# Patient Record
Sex: FEMALE | Race: WHITE | Employment: UNEMPLOYED | URBAN - METROPOLITAN AREA
[De-identification: names, ages, dates, MRNs, and addresses within clinical notes are randomized per-mention and may not be internally consistent; named-entity substitution may affect disease eponyms.]

---

## 2017-01-16 ENCOUNTER — OFFICE VISIT (OUTPATIENT)
Dept: FAMILY MEDICINE CLINIC | Facility: CLINIC | Age: 47
End: 2017-01-16

## 2017-01-16 VITALS
HEIGHT: 66.54 IN | HEART RATE: 102 BPM | WEIGHT: 213.81 LBS | BODY MASS INDEX: 33.96 KG/M2 | DIASTOLIC BLOOD PRESSURE: 68 MMHG | SYSTOLIC BLOOD PRESSURE: 112 MMHG

## 2017-01-16 DIAGNOSIS — E66.09 NON MORBID OBESITY DUE TO EXCESS CALORIES: ICD-10-CM

## 2017-01-16 DIAGNOSIS — B02.9 HERPES ZOSTER WITHOUT COMPLICATION: ICD-10-CM

## 2017-01-16 DIAGNOSIS — E28.2 PCOS (POLYCYSTIC OVARIAN SYNDROME): ICD-10-CM

## 2017-01-16 DIAGNOSIS — Z51.81 THERAPEUTIC DRUG MONITORING: Primary | ICD-10-CM

## 2017-01-16 PROCEDURE — 99214 OFFICE O/P EST MOD 30 MIN: CPT | Performed by: FAMILY MEDICINE

## 2017-01-16 NOTE — PROGRESS NOTES
Matthew Curling is a 42year old female. HPI:       Rash:  Noticed 8 days ago on Monday. This is the same rash she had in the same place twice in the past.  Last time she had this rash we did diagnose her with zoster.   Intermittently has sharp shooting pains Former Smoker                   Packs/Day: 0.50  Years: 4         Types: Cigarettes      Quit date: 01/01/2000    Smokeless Status: Never Used                        Alcohol Use: No                 REVIEW OF SYSTEMS:   Review of Systems   Constitutional: N 33.0-33.9,adult  Non morbid obesity due to excess calories  Herpes zoster without complication    1. Therapeutic drug monitoring  Medication use, risks, benefits, side effects and precautions discussed, patient verbalizes understanding.  Questions encourage

## 2017-01-17 PROBLEM — B02.9 HERPES ZOSTER WITHOUT COMPLICATION: Status: ACTIVE | Noted: 2017-01-17

## 2017-02-09 ENCOUNTER — TELEPHONE (OUTPATIENT)
Dept: FAMILY MEDICINE CLINIC | Facility: CLINIC | Age: 47
End: 2017-02-09

## 2017-02-09 RX ORDER — PHENTERMINE HYDROCHLORIDE 15 MG/1
15 CAPSULE ORAL EVERY MORNING
Qty: 30 CAPSULE | Refills: 0 | Status: SHIPPED | OUTPATIENT
Start: 2017-02-09 | End: 2017-03-13

## 2017-02-09 RX ORDER — TOPIRAMATE 25 MG/1
TABLET ORAL
Qty: 70 TABLET | Refills: 0 | Status: SHIPPED | OUTPATIENT
Start: 2017-02-09 | End: 2017-03-13 | Stop reason: DRUGHIGH

## 2017-02-09 NOTE — TELEPHONE ENCOUNTER
Pt states the last time she was in she forgot to talk to Dr. Shelli Myers about her weight loss medication. She said Dr. Shelli Myers told her there was another medication that would benefit her because her body is used to the Phentermine.   She wants to know if the

## 2017-02-10 NOTE — TELEPHONE ENCOUNTER
lmom for pt with detailed instructions and also that Phentermine was called in to pharmacy. Asked pt after reviewing message to call office with any questions.

## 2017-02-10 NOTE — TELEPHONE ENCOUNTER
Prescription sent for topiramate, please explain to patient that we will titrate up on the dose from a starting point of taking once a day in the morning.   Please inform patient that there can be several side effects, one of which some people get and some

## 2017-02-20 ENCOUNTER — TELEPHONE (OUTPATIENT)
Dept: FAMILY MEDICINE CLINIC | Facility: CLINIC | Age: 47
End: 2017-02-20

## 2017-02-20 NOTE — TELEPHONE ENCOUNTER
The Phentermine was filled to [facility] on 2/9/17. Called [facility] and they had it ready but will cancel it.   Script called then to Jan per pt request.

## 2017-03-13 ENCOUNTER — OFFICE VISIT (OUTPATIENT)
Dept: FAMILY MEDICINE CLINIC | Facility: CLINIC | Age: 47
End: 2017-03-13

## 2017-03-13 VITALS
HEART RATE: 76 BPM | WEIGHT: 210 LBS | HEIGHT: 66.54 IN | BODY MASS INDEX: 33.35 KG/M2 | SYSTOLIC BLOOD PRESSURE: 112 MMHG | DIASTOLIC BLOOD PRESSURE: 74 MMHG

## 2017-03-13 DIAGNOSIS — Z71.3 ENCOUNTER FOR WEIGHT LOSS COUNSELING: ICD-10-CM

## 2017-03-13 DIAGNOSIS — Z51.81 THERAPEUTIC DRUG MONITORING: Primary | ICD-10-CM

## 2017-03-13 DIAGNOSIS — E66.09 NON MORBID OBESITY DUE TO EXCESS CALORIES: ICD-10-CM

## 2017-03-13 DIAGNOSIS — Z79.899 HIGH RISK MEDICATION USE: ICD-10-CM

## 2017-03-13 DIAGNOSIS — E28.2 PCOS (POLYCYSTIC OVARIAN SYNDROME): ICD-10-CM

## 2017-03-13 PROCEDURE — 99213 OFFICE O/P EST LOW 20 MIN: CPT | Performed by: FAMILY MEDICINE

## 2017-03-13 RX ORDER — TOPIRAMATE 50 MG/1
50 TABLET, FILM COATED ORAL 2 TIMES DAILY
Qty: 60 TABLET | Refills: 2 | Status: SHIPPED | OUTPATIENT
Start: 2017-03-13 | End: 2017-06-05

## 2017-03-13 RX ORDER — PHENTERMINE HYDROCHLORIDE 15 MG/1
15 CAPSULE ORAL EVERY MORNING
Qty: 30 CAPSULE | Refills: 0 | Status: SHIPPED | OUTPATIENT
Start: 2017-03-13 | End: 2017-07-10

## 2017-03-13 NOTE — PROGRESS NOTES
Angie Richards is a 42year old female. HPI:     Obesity: first follow up since starting the topiramate. Some intermittent tingling of the finger tips and toe tips. Patient states that this side effect is tolerable.   Patient would like to continue with the Negative for rash. Neurological: Negative for dizziness, light-headedness and headaches. Psychiatric/Behavioral: Negative for depressed mood. The patient is not nervous/anxious.         EXAM:   /74 mmHg  Pulse 76  Ht 66.54\"  Wt 210 lb  BMI 33.35 calories  As above in #3. Medication use, risks, benefits, side effects and precautions discussed, patient verbalizes understanding. Questions encouraged and answered to patient's satisfaction.         Meds & Refills for this Visit:  Signed Prescriptio

## 2017-03-29 ENCOUNTER — TELEPHONE (OUTPATIENT)
Dept: FAMILY MEDICINE CLINIC | Facility: CLINIC | Age: 47
End: 2017-03-29

## 2017-04-24 ENCOUNTER — LAB ENCOUNTER (OUTPATIENT)
Dept: LAB | Age: 47
End: 2017-04-24
Attending: FAMILY MEDICINE
Payer: COMMERCIAL

## 2017-04-24 ENCOUNTER — OFFICE VISIT (OUTPATIENT)
Dept: FAMILY MEDICINE CLINIC | Facility: CLINIC | Age: 47
End: 2017-04-24

## 2017-04-24 VITALS
HEIGHT: 66.54 IN | WEIGHT: 204.81 LBS | HEART RATE: 96 BPM | BODY MASS INDEX: 32.53 KG/M2 | SYSTOLIC BLOOD PRESSURE: 108 MMHG | DIASTOLIC BLOOD PRESSURE: 72 MMHG

## 2017-04-24 DIAGNOSIS — Z00.00 LABORATORY EXAM ORDERED AS PART OF ROUTINE GENERAL MEDICAL EXAMINATION: ICD-10-CM

## 2017-04-24 DIAGNOSIS — Z00.00 ROUTINE GENERAL MEDICAL EXAMINATION AT A HEALTH CARE FACILITY: ICD-10-CM

## 2017-04-24 DIAGNOSIS — Z30.011 ORAL CONTRACEPTION INITIAL PRESCRIPTION: ICD-10-CM

## 2017-04-24 DIAGNOSIS — Z01.419 ENCOUNTER FOR GYNECOLOGICAL EXAMINATION WITHOUT ABNORMAL FINDING: Primary | ICD-10-CM

## 2017-04-24 DIAGNOSIS — E66.09 NON MORBID OBESITY DUE TO EXCESS CALORIES: ICD-10-CM

## 2017-04-24 DIAGNOSIS — Z86.19 HISTORY OF HERPES ZOSTER: ICD-10-CM

## 2017-04-24 DIAGNOSIS — R10.2 PELVIC PAIN: ICD-10-CM

## 2017-04-24 DIAGNOSIS — Z12.31 ENCOUNTER FOR SCREENING MAMMOGRAM FOR MALIGNANT NEOPLASM OF BREAST: ICD-10-CM

## 2017-04-24 DIAGNOSIS — E28.2 PCOS (POLYCYSTIC OVARIAN SYNDROME): ICD-10-CM

## 2017-04-24 DIAGNOSIS — Z12.4 SCREENING FOR MALIGNANT NEOPLASM OF CERVIX: ICD-10-CM

## 2017-04-24 PROCEDURE — 88175 CYTOPATH C/V AUTO FLUID REDO: CPT | Performed by: FAMILY MEDICINE

## 2017-04-24 PROCEDURE — 82306 VITAMIN D 25 HYDROXY: CPT | Performed by: FAMILY MEDICINE

## 2017-04-24 PROCEDURE — 99396 PREV VISIT EST AGE 40-64: CPT | Performed by: FAMILY MEDICINE

## 2017-04-24 PROCEDURE — 80050 GENERAL HEALTH PANEL: CPT | Performed by: FAMILY MEDICINE

## 2017-04-24 PROCEDURE — 36415 COLL VENOUS BLD VENIPUNCTURE: CPT | Performed by: FAMILY MEDICINE

## 2017-04-24 PROCEDURE — 84439 ASSAY OF FREE THYROXINE: CPT | Performed by: FAMILY MEDICINE

## 2017-04-24 PROCEDURE — 80061 LIPID PANEL: CPT | Performed by: FAMILY MEDICINE

## 2017-04-24 RX ORDER — TOPIRAMATE 50 MG/1
50 TABLET, FILM COATED ORAL 2 TIMES DAILY
COMMUNITY
End: 2017-06-05

## 2017-04-24 RX ORDER — NORGESTIMATE AND ETHINYL ESTRADIOL 0.25-0.035
1 KIT ORAL DAILY
Qty: 1 PACKAGE | Refills: 5 | Status: SHIPPED | OUTPATIENT
Start: 2017-04-24 | End: 2017-08-14

## 2017-04-24 NOTE — PATIENT INSTRUCTIONS
SCHEDULING [facility] LAB APPOINTMENTS ONLINE    Lab appointments can now be scheduled online at [email or web address]    · Go to [email or web address]  · In Search type Lab  · Click \"Lab services\"  · Click \"Schedule Your Test Online\"  · Follow the prompts  · often you should have a mammogram.   Pap test: at least every 3 years if you have ever had sex and have not had your uterus removed.  Your healthcare provider may recommend more frequent screening if you have had any abnormalities in the past or if you have smoke or do not get regular exercise). Osteoporosis is a disease that thins and weakens bones to the point where they break easily. Hearing test: if you are 72 or older   Vision test: if you are 72 or older.    Remember, these are the minimum recommendati a hepatitis shot and for all adults who are at risk of infection.  This includes, for example, women who have more than 1 sex partner or whose partner has more than 1 partner, or who have a sexually transmitted infection, abuse IV drugs, or plan to travel w partners. Hormone use: During or after menopause, discuss the risks and benefits of use of estrogen and progesterone replacement with your healthcare provider.    Osteoporosis prevention:  Advise 1,500 mg of calcium with 1,000 iu of vitamin D daily and da

## 2017-04-24 NOTE — PROGRESS NOTES
HPI:   Carolyn Goldstein is a 42year old female who presents for a complete physical exam.   Symptoms: denies discharge, itching, burning or dysuria, periods are every 21-24 days.    Last PAP: 10/2015  Abnormal PAP: LAINEY 1995  Sexually active: yes   Last mammogr Father    • idiopathic hypertrophic subaortic stenosis [Other] [OTHER] Paternal Grandfather       Social History:     Smoking Status: Former Smoker                   Packs/Day: 0.50  Years: 4         Types: Cigarettes      Quit date: 01/01/2000    Smokeles without murmur normal S1S2  ABD:  normal bowel sounds,soft, non tender, no masses, HSM or tenderness  : External genitalia normal, introitus and vagina are normal, normal discharge and normal cervix.   Bimanual exam normal, no adnexal masses or cervical m to the lungs, heart or brain and cause Heart Attack, Stroke and even Death. These risks are further increased with smoking. Do not start smoking. Patient verbalizes understanding.    - Norgestimate-Eth Estradiol ([address] [address]) 0.25-35 MG-MCG Oral Tab;  Take on 03/30/2016  Influenza Vaccine(1) due on 09/01/2016  Annual Depression Screen due on 01/13/2017  Return in about 2 months (around 6/24/2017) for PCOS. Self breast exam explained and advised to perform once a month.  Health maintenance guidance gi

## 2017-04-27 ENCOUNTER — TELEPHONE (OUTPATIENT)
Dept: FAMILY MEDICINE CLINIC | Facility: CLINIC | Age: 47
End: 2017-04-27

## 2017-04-27 NOTE — TELEPHONE ENCOUNTER
----- Message from Finesse Morrison DO sent at 4/26/2017  9:16 PM CDT -----  Pap and high-risk HPV negative

## 2017-05-03 ENCOUNTER — TELEPHONE (OUTPATIENT)
Dept: FAMILY MEDICINE CLINIC | Facility: CLINIC | Age: 47
End: 2017-05-03

## 2017-05-03 DIAGNOSIS — R79.89 ABNORMAL CBC: Primary | ICD-10-CM

## 2017-05-03 NOTE — TELEPHONE ENCOUNTER
----- Message from Christian Rojo DO sent at 5/2/2017  6:51 PM CDT -----  Please call patient: Kidney function is a little bit elevated, this is happened in the past as well. Please encourage patient to drink plenty of water and avoid NSAIDs.   Lipid pan

## 2017-05-03 NOTE — TELEPHONE ENCOUNTER
Neda Theodore would like a return phone call at her work number [phone] she cannot have her cell with her today

## 2017-05-03 NOTE — TELEPHONE ENCOUNTER
A message was left on the patient's confidential voicemail asking her to call back to go over her test results. An order was placed for a CBC to be done in 3 months.

## 2017-05-15 ENCOUNTER — HOSPITAL ENCOUNTER (OUTPATIENT)
Age: 47
Discharge: HOME | End: 2017-05-15
Payer: COMMERCIAL

## 2017-05-15 ENCOUNTER — APPOINTMENT (OUTPATIENT)
Dept: GENERAL RADIOLOGY | Age: 47
End: 2017-05-15
Attending: PHYSICIAN ASSISTANT
Payer: COMMERCIAL

## 2017-05-15 ENCOUNTER — TELEPHONE (OUTPATIENT)
Dept: FAMILY MEDICINE CLINIC | Facility: CLINIC | Age: 47
End: 2017-05-15

## 2017-05-15 VITALS
TEMPERATURE: 98 F | HEART RATE: 78 BPM | RESPIRATION RATE: 18 BRPM | SYSTOLIC BLOOD PRESSURE: 157 MMHG | DIASTOLIC BLOOD PRESSURE: 98 MMHG | OXYGEN SATURATION: 98 %

## 2017-05-15 DIAGNOSIS — S60.211A CONTUSION OF RIGHT WRIST, INITIAL ENCOUNTER: ICD-10-CM

## 2017-05-15 DIAGNOSIS — S60.211A CONTUSION OF RIGHT WRIST, INITIAL ENCOUNTER: Primary | ICD-10-CM

## 2017-05-15 DIAGNOSIS — S66.911A WRIST STRAIN, RIGHT, INITIAL ENCOUNTER: Primary | ICD-10-CM

## 2017-05-15 DIAGNOSIS — R10.2 PELVIC PAIN IN FEMALE: ICD-10-CM

## 2017-05-15 DIAGNOSIS — E28.2 PCOS (POLYCYSTIC OVARIAN SYNDROME): ICD-10-CM

## 2017-05-15 DIAGNOSIS — S66.911A WRIST STRAIN, RIGHT, INITIAL ENCOUNTER: ICD-10-CM

## 2017-05-15 PROCEDURE — 99204 OFFICE O/P NEW MOD 45 MIN: CPT

## 2017-05-15 PROCEDURE — 73110 X-RAY EXAM OF WRIST: CPT | Performed by: PHYSICIAN ASSISTANT

## 2017-05-15 PROCEDURE — 73130 X-RAY EXAM OF HAND: CPT | Performed by: PHYSICIAN ASSISTANT

## 2017-05-15 PROCEDURE — 99213 OFFICE O/P EST LOW 20 MIN: CPT

## 2017-05-15 RX ORDER — NAPROXEN 500 MG/1
500 TABLET ORAL 2 TIMES DAILY PRN
Qty: 30 TABLET | Refills: 0 | Status: SHIPPED | OUTPATIENT
Start: 2017-05-15 | End: 2017-05-22

## 2017-05-15 NOTE — TELEPHONE ENCOUNTER
Dr Miri Ayers advise. Do you want to order thumb xray(not sure which thumb), have patient see you first or send her to ?

## 2017-05-15 NOTE — ED INITIAL ASSESSMENT (HPI)
Patient states was playing basketball and inured her right thumb and wrist  Discomfort  Swelling noted

## 2017-05-15 NOTE — ED PROVIDER NOTES
Patient Seen in: [facility]    History   No chief complaint on file.     Stated Complaint: HAND/WRIST INJURY     HPI    41yo F who comes in with complaints of right wrist and hand pain it began 6 weeks ago when she was getting  Grandfather    • idiopathic hyprtrophic subaortic stenosis [Other] [OTHER] Father    • idiopathic hypertrophic subaortic stenosis [Other] [OTHER] Paternal Grandfather          Smoking Status: Former Smoker                   Packs/Day: 0.50  Years: 4 deformity and no laceration. Right hand: She exhibits tenderness (over dorsal hand on 1st and 2nd metacarpals, mild non specific snuffbox ttp). Normal sensation noted. Normal strength noted. Lymphadenopathy:     She has no cervical adenopathy.    Blayne Kitchen thumb spica, follow up with RONDA davila    The patient is in good condition throughout her visit today and remains so upon discharge.  I discuss the plan of care with the patient, who expresses understanding.  All questions and concerns are addressed to th

## 2017-05-16 NOTE — TELEPHONE ENCOUNTER
Patient also requesting a new order for the pelvic US that was ordered 6/20/16. Patient has new insurance.   New order placed for pelvis US

## 2017-05-16 NOTE — TELEPHONE ENCOUNTER
LMTCB    Patient was seen at [facility] last night. X-ray show no fracture.  Patient is to f/u with ortho per d/c instructions    Disposition:  Discharge    Follow-up:  Haylie Holbrook, [address]    Schedule an alex

## 2017-05-16 NOTE — TELEPHONE ENCOUNTER
Per Dr Savanna Leach patient to Dr Selvin Dolan  Referral placed.   Per phone consent left message for patient with contact info for Dr Selvin oDlan   [phone]  [address] [place] [address]

## 2017-05-24 ENCOUNTER — TELEPHONE (OUTPATIENT)
Dept: FAMILY MEDICINE CLINIC | Facility: CLINIC | Age: 47
End: 2017-05-24

## 2017-05-24 DIAGNOSIS — M25.519 SHOULDER PAIN, UNSPECIFIED CHRONICITY, UNSPECIFIED LATERALITY: Primary | ICD-10-CM

## 2017-05-24 NOTE — TELEPHONE ENCOUNTER
Dr Rebecca Moura you want patient to see Dr Devin Walls or do you want to refer her to another ortho?

## 2017-05-25 ENCOUNTER — TELEPHONE (OUTPATIENT)
Dept: FAMILY MEDICINE CLINIC | Facility: CLINIC | Age: 47
End: 2017-05-25

## 2017-05-25 DIAGNOSIS — S60.211A CONTUSION OF RIGHT WRIST, INITIAL ENCOUNTER: Primary | ICD-10-CM

## 2017-05-25 DIAGNOSIS — IMO0001 STRAIN OF RIGHT HAND AND FINGER, INITIAL ENCOUNTER: ICD-10-CM

## 2017-05-25 NOTE — TELEPHONE ENCOUNTER
Juan Day and/or Dunia Verma,  Please call the  for [facility] [place]:  Pt first called and was told she couldn't be seen by Dr. Malvin Jones because for a hand problem because he does not see pts with hand problems. Now we have the problem as below.   D

## 2017-05-26 NOTE — TELEPHONE ENCOUNTER
Dr Stevo Arellano, [facility] does have Dr Sole Alexandre who is in network but she only sees patient in [facility] or [place]  Per Dr Savanna Leach patient to Dr Sandeep Bower

## 2017-06-05 ENCOUNTER — TELEPHONE (OUTPATIENT)
Dept: FAMILY MEDICINE CLINIC | Facility: CLINIC | Age: 47
End: 2017-06-05

## 2017-06-05 DIAGNOSIS — Z51.81 THERAPEUTIC DRUG MONITORING: Primary | ICD-10-CM

## 2017-06-05 RX ORDER — TOPIRAMATE 50 MG/1
50 TABLET, FILM COATED ORAL 2 TIMES DAILY
Qty: 60 TABLET | Refills: 2 | Status: SHIPPED | OUTPATIENT
Start: 2017-06-05 | End: 2017-08-14

## 2017-06-20 ENCOUNTER — TELEPHONE (OUTPATIENT)
Dept: FAMILY MEDICINE CLINIC | Facility: CLINIC | Age: 47
End: 2017-06-20

## 2017-06-20 RX ORDER — ALPRAZOLAM 0.25 MG/1
TABLET ORAL
Qty: 20 TABLET | Refills: 0 | Status: SHIPPED | OUTPATIENT
Start: 2017-06-20 | End: 2017-07-10

## 2017-06-20 NOTE — TELEPHONE ENCOUNTER
Future Appointments  Date Time Provider Laura Burdick   7/10/2017 10:30 AM Rene See, DO [facility] [place]       Pt is asking if she can go on medication for anxiety that previously discussed with Dr Lorena Morelos.   Her daughter injured her collar b

## 2017-06-20 NOTE — TELEPHONE ENCOUNTER
Order placed for alprazolam/xanax,  Please phone or fax in. Please advise pt this medication may make her sleepy and not to drive or drink any alcohol when taking this medication.   We will discuss further at next appt

## 2017-06-20 NOTE — TELEPHONE ENCOUNTER
Dr Linder advise. Not sure when this was discussed with patient  Do you want to wait until her ov on 7/10/17 to discuss?

## 2017-06-21 NOTE — TELEPHONE ENCOUNTER
rx has been phoned in to pharmacy  Per phone consent left message for patient that rx has been sent to her pharmacy and cautioned her not to drink or drive while taking this medication.

## 2017-07-10 PROBLEM — F41.1 GAD (GENERALIZED ANXIETY DISORDER): Status: ACTIVE | Noted: 2017-07-10

## 2017-07-10 PROBLEM — R10.2 PELVIC PAIN: Status: ACTIVE | Noted: 2017-07-10

## 2017-07-10 NOTE — PATIENT INSTRUCTIONS
Understanding Anxiety Disorders  Almost everyone gets nervous now and then. It’s normal to have knots in your stomach before a test, or for your heart to race on a first date. But an anxiety disorder is much more than a case of nerves.  In fact, its sympt You may believe that nothing can help you. Or, you might fear what others may think. But most anxiety symptoms can be eased. Having an anxiety disorder is nothing to be ashamed of. Most people do best with treatment that combines medication and therapy.  Al Anxiety can become a problem when it is difficult to control, occurs for months, and interferes with important parts of your life. With an anxiety disorder, your body has the response described above, but in inappropriate ways.  The response a person has de If you have an anxiety disorder, you don’t have to suffer anymore. Treatment is available. Therapy (also called counseling) is often a helpful treatment for anxiety disorders.  With therapy, a specially trained professional (therapist) helps you face and le Therapy will help you feel better and teach you skills to help manage anxiety long term. But change doesn’t happen right away. It takes a commitment from you. And treatment only works if you learn to face the causes of your anxiety.  So, you might feel wors

## 2017-07-10 NOTE — PROGRESS NOTES
Ilda Mon is a 42year old female. HPI:       Anxiety:  Feels it was triggered or worsened by by getting braces. Grinds her teeth all the time, starting at the age of 4 or 5. Has been seeing the dentist because of the teeth grinding.   States has endur tablets by mouth as needed.    Disp:  Rfl:       Patient Active Problem List:     Herpes zoster     Bruxism     PCOS (polycystic ovarian syndrome)     Routine general medical examination at a health care facility     Heel pain, chronic     BMI 32.0-32.9,elvie thoughts of harming herself or others       EXAM:   /78 (BP Location: Right arm, Patient Position: Sitting, Cuff Size: large)   Pulse 76   Resp 18   Ht 66.54\"   Wt 205 lb 3.2 oz   BMI 32.58 kg/m²  Estimated body mass index is 32.58 kg/m² as calculat anxiety with sertraline will help reduce the bruxism. Follow-up with dentist as instructed. 3. PCOS (polycystic ovarian syndrome)  Continue metformin. Pelvic ultrasound reordered. - MetFORMIN HCl 1000 MG Oral Tab;  Take 1 tablet (1,000 mg total) by mo for 4-6 weeks for therapeutic drug monitoring and start of sertraline.

## 2017-07-10 NOTE — ADDENDUM NOTE
Encounter addended by:  Drake Ellis [place] on: 7/10/2017  1:08 PM<BR>    Actions taken:  activity accessed

## 2017-07-24 ENCOUNTER — HOSPITAL ENCOUNTER (OUTPATIENT)
Dept: MAMMOGRAPHY | Age: 47
Discharge: HOME | End: 2017-07-24
Attending: FAMILY MEDICINE
Payer: COMMERCIAL

## 2017-07-24 ENCOUNTER — HOSPITAL ENCOUNTER (OUTPATIENT)
Dept: ULTRASOUND IMAGING | Age: 47
Discharge: HOME | End: 2017-07-24
Attending: FAMILY MEDICINE
Payer: COMMERCIAL

## 2017-07-24 DIAGNOSIS — R10.2 PELVIC PAIN: ICD-10-CM

## 2017-07-24 DIAGNOSIS — Z12.31 ENCOUNTER FOR SCREENING MAMMOGRAM FOR MALIGNANT NEOPLASM OF BREAST: ICD-10-CM

## 2017-07-24 DIAGNOSIS — E28.2 PCOS (POLYCYSTIC OVARIAN SYNDROME): ICD-10-CM

## 2017-07-24 PROCEDURE — 77063 BREAST TOMOSYNTHESIS BI: CPT | Performed by: FAMILY MEDICINE

## 2017-07-24 PROCEDURE — 76830 TRANSVAGINAL US NON-OB: CPT | Performed by: FAMILY MEDICINE

## 2017-07-24 PROCEDURE — 77067 SCR MAMMO BI INCL CAD: CPT | Performed by: FAMILY MEDICINE

## 2017-07-24 PROCEDURE — 76856 US EXAM PELVIC COMPLETE: CPT | Performed by: FAMILY MEDICINE

## 2017-07-25 ENCOUNTER — TELEPHONE (OUTPATIENT)
Dept: FAMILY MEDICINE CLINIC | Facility: CLINIC | Age: 47
End: 2017-07-25

## 2017-07-25 DIAGNOSIS — R92.8 ABNORMAL MAGNETIC RESONANCE IMAGING OF RIGHT BREAST: Primary | ICD-10-CM

## 2017-07-25 NOTE — TELEPHONE ENCOUNTER
----- Message from Susie Aguilera DO sent at 7/25/2017 10:30 AM CDT -----  Please call patient: The ultrasound of the pelvis reports retroflexion of the uterus, this is considered a normal variant.   Ovarian follicles are reported but it is stated that th

## 2017-07-25 NOTE — TELEPHONE ENCOUNTER
----- Message from Irina Gomez DO sent at 7/25/2017  9:56 AM CDT -----  Please call patient and verify she knows the radiologist is requesting additional views.

## 2017-07-26 NOTE — TELEPHONE ENCOUNTER
Spoke to patient about her test results/instructions. Pt verbalized understanding. Orders in system for additional views.

## 2017-08-02 ENCOUNTER — HOSPITAL ENCOUNTER (OUTPATIENT)
Dept: MAMMOGRAPHY | Facility: HOSPITAL | Age: 47
Discharge: HOME | End: 2017-08-02
Attending: FAMILY MEDICINE
Payer: COMMERCIAL

## 2017-08-02 DIAGNOSIS — R92.2 INCONCLUSIVE MAMMOGRAM: ICD-10-CM

## 2017-08-02 PROCEDURE — 77061 BREAST TOMOSYNTHESIS UNI: CPT | Performed by: FAMILY MEDICINE

## 2017-08-02 PROCEDURE — 77065 DX MAMMO INCL CAD UNI: CPT | Performed by: FAMILY MEDICINE

## 2017-08-02 PROCEDURE — 76642 ULTRASOUND BREAST LIMITED: CPT | Performed by: FAMILY MEDICINE

## 2017-08-07 ENCOUNTER — LABORATORY ENCOUNTER (OUTPATIENT)
Dept: LAB | Age: 47
End: 2017-08-07
Attending: FAMILY MEDICINE
Payer: COMMERCIAL

## 2017-08-07 DIAGNOSIS — R79.89 ABNORMAL CBC: ICD-10-CM

## 2017-08-07 LAB
BASOPHILS # BLD AUTO: 0.01 X10(3) UL (ref 0–0.1)
BASOPHILS NFR BLD AUTO: 0.2 %
EOSINOPHIL # BLD AUTO: 0.2 X10(3) UL (ref 0–0.3)
EOSINOPHIL NFR BLD AUTO: 3 %
ERYTHROCYTE [DISTWIDTH] IN BLOOD BY AUTOMATED COUNT: 13.8 % (ref 11.5–16)
HCT VFR BLD AUTO: 39.3 % (ref 34–50)
HGB BLD-MCNC: 12.7 G/DL (ref 12–16)
IMMATURE GRANULOCYTE COUNT: 0.02 X10(3) UL (ref 0–1)
IMMATURE GRANULOCYTE RATIO %: 0.3 %
LYMPHOCYTES # BLD AUTO: 1.41 X10(3) UL (ref 0.9–4)
LYMPHOCYTES NFR BLD AUTO: 21.4 %
MCH RBC QN AUTO: 28.3 PG (ref 27–33.2)
MCHC RBC AUTO-ENTMCNC: 32.3 G/DL (ref 31–37)
MCV RBC AUTO: 87.5 FL (ref 81–100)
MONOCYTES # BLD AUTO: 0.47 X10(3) UL (ref 0.1–0.6)
MONOCYTES NFR BLD AUTO: 7.1 %
NEUTROPHIL ABS PRELIM: 4.48 X10 (3) UL (ref 1.3–6.7)
NEUTROPHILS # BLD AUTO: 4.48 X10(3) UL (ref 1.3–6.7)
NEUTROPHILS NFR BLD AUTO: 68 %
PLATELET # BLD AUTO: 334 10(3)UL (ref 150–450)
RBC # BLD AUTO: 4.49 X10(6)UL (ref 3.8–5.1)
RED CELL DISTRIBUTION WIDTH-SD: 44.3 FL (ref 35.1–46.3)
WBC # BLD AUTO: 6.6 X10(3) UL (ref 4–13)

## 2017-08-07 PROCEDURE — 36415 COLL VENOUS BLD VENIPUNCTURE: CPT

## 2017-08-07 PROCEDURE — 85025 COMPLETE CBC W/AUTO DIFF WBC: CPT

## 2017-08-14 ENCOUNTER — OFFICE VISIT (OUTPATIENT)
Dept: FAMILY MEDICINE CLINIC | Facility: CLINIC | Age: 47
End: 2017-08-14

## 2017-08-14 VITALS
RESPIRATION RATE: 16 BRPM | SYSTOLIC BLOOD PRESSURE: 110 MMHG | HEIGHT: 66.54 IN | HEART RATE: 81 BPM | WEIGHT: 199.63 LBS | DIASTOLIC BLOOD PRESSURE: 76 MMHG | BODY MASS INDEX: 31.7 KG/M2

## 2017-08-14 DIAGNOSIS — E66.09 CLASS 1 OBESITY DUE TO EXCESS CALORIES WITHOUT SERIOUS COMORBIDITY WITH BODY MASS INDEX (BMI) OF 31.0 TO 31.9 IN ADULT: ICD-10-CM

## 2017-08-14 DIAGNOSIS — F41.1 GAD (GENERALIZED ANXIETY DISORDER): ICD-10-CM

## 2017-08-14 DIAGNOSIS — E28.2 PCOS (POLYCYSTIC OVARIAN SYNDROME): ICD-10-CM

## 2017-08-14 DIAGNOSIS — Z51.81 THERAPEUTIC DRUG MONITORING: Primary | ICD-10-CM

## 2017-08-14 PROCEDURE — 99214 OFFICE O/P EST MOD 30 MIN: CPT | Performed by: FAMILY MEDICINE

## 2017-08-14 RX ORDER — ALPRAZOLAM 0.25 MG/1
TABLET ORAL
Qty: 30 TABLET | Refills: 0 | Status: SHIPPED | OUTPATIENT
Start: 2017-08-14 | End: 2017-10-23

## 2017-08-14 NOTE — PROGRESS NOTES
Corrinne Mace is a 42year old female. HPI:       Anxiety:  First follow up on start of sertraline. Sertraline is helping with her mood. Feels \"so much calmer\". Using the alprazolam a few times a week, 3 times a week approx.   Using the alprazolam less Adhesive Tape               Comment:Tape takes skin off   Past Medical History:   Diagnosis Date   • Bruxism 3/1/2013   • Chronic headaches 3/16/2013    Secondary to #s 2,4,5    • RENE (generalized anxiety disorder) 7/10/2017   • Herpes zoster without c person, place, and time. She appears well-developed and well-nourished. HENT:   Head: Normocephalic and atraumatic. Mouth/Throat: Oropharynx is clear and moist.   Eyes: Conjunctivae and EOM are normal. Pupils are equal, round, and reactive to light. % 3.0 3.9 3.7 4.5 (H)   Basophils %      % 0.2 0.1 0.1 0.1   Immature Granulocyte %      % 0.3 0.3     RED CELL DISTRIBUTION WIDTH      11.5 - 16.0 %    13.4   MEAN PLATELET VOLUME      9.4 - 12.4 fL    11.0   PRELIMINARY NEUTROPHIL ABS      1.3 - 6.7 10 benefits, side effects and precautions discussed, patient verbalizes understanding. Questions encouraged and answered to patient's satisfaction.    - ALPRAZolam 0.25 MG Oral Tab; 1 tab po 1-2 times a day prn anxiety  Dispense: 30 tablet;  Refill: 0  - Sertr

## 2017-08-14 NOTE — PATIENT INSTRUCTIONS
Try to eat small meals every 3 hours. Recommend lean meats such as skinless chicken breast, 90% lean ground beef, lean ground turkey, pork loin, and any type of fish. Bake, broil or grill. No frying. Avoid cooking in oils.   Okay to use non-stick

## 2017-10-23 ENCOUNTER — OFFICE VISIT (OUTPATIENT)
Dept: FAMILY MEDICINE CLINIC | Facility: CLINIC | Age: 47
End: 2017-10-23

## 2017-10-23 VITALS
HEART RATE: 80 BPM | WEIGHT: 194.63 LBS | HEIGHT: 66 IN | BODY MASS INDEX: 31.28 KG/M2 | DIASTOLIC BLOOD PRESSURE: 64 MMHG | SYSTOLIC BLOOD PRESSURE: 110 MMHG | RESPIRATION RATE: 16 BRPM

## 2017-10-23 DIAGNOSIS — Z51.81 THERAPEUTIC DRUG MONITORING: ICD-10-CM

## 2017-10-23 DIAGNOSIS — F41.1 GAD (GENERALIZED ANXIETY DISORDER): ICD-10-CM

## 2017-10-23 DIAGNOSIS — E28.2 PCOS (POLYCYSTIC OVARIAN SYNDROME): ICD-10-CM

## 2017-10-23 DIAGNOSIS — Z71.3 ENCOUNTER FOR WEIGHT LOSS COUNSELING: ICD-10-CM

## 2017-10-23 DIAGNOSIS — E66.09 NON MORBID OBESITY DUE TO EXCESS CALORIES: ICD-10-CM

## 2017-10-23 PROCEDURE — 99214 OFFICE O/P EST MOD 30 MIN: CPT | Performed by: FAMILY MEDICINE

## 2017-10-23 RX ORDER — ASCORBIC ACID 500 MG
500 TABLET ORAL DAILY
Status: ON HOLD | COMMUNITY
End: 2019-08-13

## 2017-10-23 RX ORDER — SERTRALINE HYDROCHLORIDE 100 MG/1
100 TABLET, FILM COATED ORAL DAILY
Qty: 90 TABLET | Refills: 0 | Status: SHIPPED | OUTPATIENT
Start: 2017-10-23 | End: 2017-11-15

## 2017-10-23 RX ORDER — ALPRAZOLAM 0.25 MG/1
TABLET ORAL
Qty: 45 TABLET | Refills: 1 | Status: SHIPPED | OUTPATIENT
Start: 2017-10-23 | End: 2017-11-27

## 2017-10-23 RX ORDER — TOPIRAMATE 50 MG/1
50 TABLET, FILM COATED ORAL 2 TIMES DAILY
Qty: 180 TABLET | Refills: 1 | Status: CANCELLED | OUTPATIENT
Start: 2017-10-23

## 2017-10-23 RX ORDER — CYANOCOBALAMIN (VITAMIN B-12) 3000MCG/ML
1 DROPS SUBLINGUAL DAILY
COMMUNITY
End: 2019-11-11 | Stop reason: CLARIF

## 2017-10-23 NOTE — PROGRESS NOTES
Angie Richards is a 41year old female. HPI:       Anxiety:  Second follow up on start of sertraline. Dose increased at last ov about 4-6 weeks ago from 25 to 50mg daily  Sertraline is helping with her mood. Feels \"so much calmer\".   Using the alprazolam monitoring     Herpes zoster without complication     RENE (generalized anxiety disorder)     Pelvic pain     Class 1 obesity due to excess calories without serious comorbidity with body mass index (BMI) of 31.0 to 31.9 in adult       Adhesive Tape encounter: 66\". Weight as of this encounter: 194 lb 9.6 oz. Physical Exam   Nursing note and vitals reviewed. Constitutional: She is oriented to person, place, and time. She appears well-developed and well-nourished.    HENT:   Head: Normocephalic an 0. 02 0.02     Neutrophils %      % 68.0 62.4 69.5 59.5   Lymphocytes %      % 21.4 24.4 19.3 27.0   Monocytes %      % 7.1 8.9 7.4 8.9   Eosinophils %      % 3.0 3.9 3.7 4.5 (H)   Basophils %      % 0.2 0.1 0.1 0.1   Immature Granulocyte %      % 0.3 0.3 calories  Encounter for weight loss counseling    1. Therapeutic drug monitoring    - ALPRAZolam 0.25 MG Oral Tab; 1 tab po 1-2 times a day prn anxiety  Dispense: 45 tablet; Refill: 1  - Sertraline HCl 100 MG Oral Tab;  Take 1 tablet (100 mg total) by mouth

## 2017-10-23 NOTE — PATIENT INSTRUCTIONS
Recommend try otc probiotic Florastor twice a day for a couple of weeks to see if it helps with the diarrhea.

## 2017-11-13 ENCOUNTER — HOSPITAL ENCOUNTER (EMERGENCY)
Facility: HOSPITAL | Age: 47
Discharge: HOME | End: 2017-11-13
Attending: EMERGENCY MEDICINE
Payer: COMMERCIAL

## 2017-11-13 ENCOUNTER — APPOINTMENT (OUTPATIENT)
Dept: ULTRASOUND IMAGING | Facility: HOSPITAL | Age: 47
End: 2017-11-13
Attending: EMERGENCY MEDICINE
Payer: COMMERCIAL

## 2017-11-13 ENCOUNTER — TELEPHONE (OUTPATIENT)
Dept: FAMILY MEDICINE CLINIC | Facility: CLINIC | Age: 47
End: 2017-11-13

## 2017-11-13 VITALS
BODY MASS INDEX: 29.25 KG/M2 | DIASTOLIC BLOOD PRESSURE: 67 MMHG | HEIGHT: 66 IN | WEIGHT: 182 LBS | SYSTOLIC BLOOD PRESSURE: 108 MMHG | RESPIRATION RATE: 18 BRPM | HEART RATE: 75 BPM | TEMPERATURE: 98 F | OXYGEN SATURATION: 100 %

## 2017-11-13 DIAGNOSIS — K80.20 CALCULUS OF GALLBLADDER WITHOUT CHOLECYSTITIS WITHOUT OBSTRUCTION: Primary | ICD-10-CM

## 2017-11-13 PROCEDURE — 96375 TX/PRO/DX INJ NEW DRUG ADDON: CPT

## 2017-11-13 PROCEDURE — 85025 COMPLETE CBC W/AUTO DIFF WBC: CPT | Performed by: EMERGENCY MEDICINE

## 2017-11-13 PROCEDURE — 80053 COMPREHEN METABOLIC PANEL: CPT | Performed by: EMERGENCY MEDICINE

## 2017-11-13 PROCEDURE — 99285 EMERGENCY DEPT VISIT HI MDM: CPT

## 2017-11-13 PROCEDURE — 87086 URINE CULTURE/COLONY COUNT: CPT | Performed by: EMERGENCY MEDICINE

## 2017-11-13 PROCEDURE — 96374 THER/PROPH/DIAG INJ IV PUSH: CPT

## 2017-11-13 PROCEDURE — 83690 ASSAY OF LIPASE: CPT | Performed by: EMERGENCY MEDICINE

## 2017-11-13 PROCEDURE — 81001 URINALYSIS AUTO W/SCOPE: CPT | Performed by: EMERGENCY MEDICINE

## 2017-11-13 PROCEDURE — 76700 US EXAM ABDOM COMPLETE: CPT | Performed by: EMERGENCY MEDICINE

## 2017-11-13 PROCEDURE — 96361 HYDRATE IV INFUSION ADD-ON: CPT

## 2017-11-13 RX ORDER — ONDANSETRON 2 MG/ML
4 INJECTION INTRAMUSCULAR; INTRAVENOUS ONCE
Status: COMPLETED | OUTPATIENT
Start: 2017-11-13 | End: 2017-11-13

## 2017-11-13 RX ORDER — MAGNESIUM HYDROXIDE/ALUMINUM HYDROXICE/SIMETHICONE 120; 1200; 1200 MG/30ML; MG/30ML; MG/30ML
30 SUSPENSION ORAL ONCE
Status: COMPLETED | OUTPATIENT
Start: 2017-11-13 | End: 2017-11-13

## 2017-11-13 RX ORDER — HYDROMORPHONE HYDROCHLORIDE 1 MG/ML
1 INJECTION, SOLUTION INTRAMUSCULAR; INTRAVENOUS; SUBCUTANEOUS ONCE
Status: COMPLETED | OUTPATIENT
Start: 2017-11-13 | End: 2017-11-13

## 2017-11-13 RX ORDER — HYDROCODONE BITARTRATE AND ACETAMINOPHEN 5; 325 MG/1; MG/1
1-2 TABLET ORAL EVERY 4 HOURS PRN
Qty: 20 TABLET | Refills: 0 | Status: SHIPPED | OUTPATIENT
Start: 2017-11-13 | End: 2017-11-17

## 2017-11-13 RX ORDER — SODIUM CHLORIDE 9 MG/ML
125 INJECTION, SOLUTION INTRAVENOUS CONTINUOUS
Status: DISCONTINUED | OUTPATIENT
Start: 2017-11-13 | End: 2017-11-13

## 2017-11-13 RX ORDER — KETOROLAC TROMETHAMINE 30 MG/ML
30 INJECTION, SOLUTION INTRAMUSCULAR; INTRAVENOUS ONCE
Status: COMPLETED | OUTPATIENT
Start: 2017-11-13 | End: 2017-11-13

## 2017-11-13 NOTE — ED INITIAL ASSESSMENT (HPI)
Pt presents to the ED accompanied by family with complaints of abdominal pain x 4 days. Pt states pain has been getting progressively worse and concerned may be her gallbladder.  Pt also states she has had diarrhea for past 2 months, worse over past 3 weeks

## 2017-11-13 NOTE — TELEPHONE ENCOUNTER
Pt called and said she has been having pain in her right side for about a week and she is rating it as an 8.   She said the pain is in between the breast and she thinks it is gallbladder issues (she has family history of gallbladder issues)  She said she ca

## 2017-11-13 NOTE — ED NOTES
Pt return from [address] [facility] and initially requested pain meds, therefore, ER RN pulled all ordered meds and presented them to Pt. Pt then declined and stated she did not want them.  ER RN emphasized to PT that if she wants anything for pain to please hit the call lig

## 2017-11-13 NOTE — TELEPHONE ENCOUNTER
Please advised patient to go to emergency department at THE [facility] in [place].

## 2017-11-13 NOTE — TELEPHONE ENCOUNTER
Spoke to patient and advised her to go to the ER but she prefers to go to an immediate care. She said she just got back from vacation and has been this way ever since. She was told that it is probably her gallbladder.   Pt verbalized understanding and she

## 2017-11-14 ENCOUNTER — TELEPHONE (OUTPATIENT)
Dept: FAMILY MEDICINE CLINIC | Facility: CLINIC | Age: 47
End: 2017-11-14

## 2017-11-14 ENCOUNTER — OFFICE VISIT (OUTPATIENT)
Dept: SURGERY | Facility: CLINIC | Age: 47
End: 2017-11-14

## 2017-11-14 ENCOUNTER — TELEPHONE (OUTPATIENT)
Dept: SURGERY | Facility: CLINIC | Age: 47
End: 2017-11-14

## 2017-11-14 VITALS
HEIGHT: 66 IN | TEMPERATURE: 98 F | BODY MASS INDEX: 29.73 KG/M2 | WEIGHT: 185 LBS | DIASTOLIC BLOOD PRESSURE: 73 MMHG | SYSTOLIC BLOOD PRESSURE: 110 MMHG | HEART RATE: 82 BPM

## 2017-11-14 DIAGNOSIS — F41.1 GAD (GENERALIZED ANXIETY DISORDER): ICD-10-CM

## 2017-11-14 DIAGNOSIS — E66.09 NON MORBID OBESITY DUE TO EXCESS CALORIES: ICD-10-CM

## 2017-11-14 DIAGNOSIS — K80.20 CALCULUS OF GALLBLADDER WITHOUT CHOLECYSTITIS WITHOUT OBSTRUCTION: Primary | ICD-10-CM

## 2017-11-14 DIAGNOSIS — K80.12 CALCULUS OF GALLBLADDER WITH ACUTE ON CHRONIC CHOLECYSTITIS WITHOUT OBSTRUCTION: Primary | ICD-10-CM

## 2017-11-14 PROCEDURE — 99244 OFF/OP CNSLTJ NEW/EST MOD 40: CPT | Performed by: COLON & RECTAL SURGERY

## 2017-11-14 NOTE — H&P
New Patient Visit Note       Active Problems      1. Calculus of gallbladder with acute on chronic cholecystitis without obstruction    2. RENE (generalized anxiety disorder)    3.  Non morbid obesity due to excess calories        Chief Complaint   Nichol Russell office. Medications include alprazolam, sertraline, topiramate, metformin. She does not take any blood thinning medications. Allergies  Amalia Marcelaclare is allergic to adhesive tape.     Past Medical / Surgical / Social / Family History    The past medical a HYDROcodone-acetaminophen 5-325 MG Oral Tab Take 1-2 tablets by mouth every 4 (four) hours as needed for Pain. Disp: 20 tablet Rfl: 0   Cyanocobalamin (VITAMIN B12) 3000 MCG/ML Sublingual Liquid Place 1 tablet under the tongue daily.  Disp:  Rfl:    Vitam LMP 11/03/2017   BMI 29.86 kg/m²   Physical Exam     Constitutional: Well nourished, well kempt  Eyes: PERRL, no scleral icterus  ENT: Nares moist, oropharynx clear  Neck: Supple, no tracheal deviation   Cardiac: Normal rate, Regular rhythm, no murmurs mg/dL 84    BUN 8 - 20 mg/dL 9    Creatinine 0.55 - 1.02 mg/dL 0.76    GFR >=60 95    Comments:    Estimated GFR units: mL/min/1.73 square meters   eGFR calculated by the CKD-EPI equation. Calcium, Total 8.3 - 10.3 mg/dL 9.5    Comments:     Total Calc renal cortical scarring. The craniocaudad dimension of the right kidney is 12 cm. There is a benign mid pole simple cyst mid left kidney measuring 3.5 cm. The craniocaudad dimension of the left kidney is 12 cm.   AORTA/IVC:  Normal.     =====  CONCLUSION: injury requiring possible immediate or delayed reconstruction potentially by a hepatobiliary surgeon, postoperative bile leak,  retained common bile duct stones, the need for post-procedure ERCP as well as the need for further therapeutic diagnostic or carlo

## 2017-11-14 NOTE — PATIENT INSTRUCTIONS
Assessment   Calculus of gallbladder with acute on chronic cholecystitis without obstruction  (primary encounter diagnosis)  RENE (generalized anxiety disorder)  Non morbid obesity due to excess calories    I personally reviewed the lab work and the images If that is the case then it is more likely the source of her symptoms would be her stomach, and at that point we could begin antacid treatment with consideration of an EGD.

## 2017-11-14 NOTE — ED PROVIDER NOTES
Patient Seen in: [facility] Emergency Department    History   Patient presents with:  Abdomen/Flank Pain (GI/)    Stated Complaint: abd pain/epigastric pain with diarrhea    HPI    80-year-old female complaining of epigastric abdominal pain the pa HEENT exam within normal limits neck there is no lymphadenopathy JVD lungs are clear cardiovascular exam shows regular rate and rhythm without murmurs abdomen soft there is moderate epigastric tenderness is mild right upper quadrant tenderness no masses gu of the gallbladder wall the patient was given Toradol Dilaudid was feeling much better repeat exam showed no tenderness case discussed with general surgery patient appeared well to go home she was given a prescription for Norco advised to follow-up with ge

## 2017-11-14 NOTE — TELEPHONE ENCOUNTER
Call made to Dr. Seven Singh office re acute cholecystitis and cholelithiasis, they will get her in to be seen this afternoon. Pt told to be NPO by surgeon's office. Please call pt re topiramate: The topiramate is not the cause of the cyst of the kidney.

## 2017-11-14 NOTE — TELEPHONE ENCOUNTER
Pt would like . THE [facility] to review her abdominal u/s she had done on 11/13/17. Pt would like to know what caused her to have a renal cyst.  Pt is very concerned it is from taking the Topiramate 50mg.     Referral placed for Dr. Molly Holloway appointment 11/15/17

## 2017-11-17 ENCOUNTER — APPOINTMENT (OUTPATIENT)
Dept: GENERAL RADIOLOGY | Facility: HOSPITAL | Age: 47
End: 2017-11-17
Attending: COLON & RECTAL SURGERY
Payer: COMMERCIAL

## 2017-11-17 ENCOUNTER — SURGERY (OUTPATIENT)
Age: 47
End: 2017-11-17

## 2017-11-17 ENCOUNTER — ANESTHESIA EVENT (OUTPATIENT)
Dept: SURGERY | Facility: HOSPITAL | Age: 47
End: 2017-11-17
Payer: COMMERCIAL

## 2017-11-17 ENCOUNTER — HOSPITAL ENCOUNTER (OUTPATIENT)
Facility: HOSPITAL | Age: 47
Setting detail: HOSPITAL OUTPATIENT SURGERY
Discharge: HOME | End: 2017-11-17
Attending: COLON & RECTAL SURGERY | Admitting: COLON & RECTAL SURGERY
Payer: COMMERCIAL

## 2017-11-17 ENCOUNTER — ANESTHESIA (OUTPATIENT)
Dept: SURGERY | Facility: HOSPITAL | Age: 47
End: 2017-11-17
Payer: COMMERCIAL

## 2017-11-17 VITALS
HEART RATE: 89 BPM | DIASTOLIC BLOOD PRESSURE: 77 MMHG | HEIGHT: 66 IN | TEMPERATURE: 98 F | OXYGEN SATURATION: 99 % | WEIGHT: 185 LBS | RESPIRATION RATE: 16 BRPM | BODY MASS INDEX: 29.73 KG/M2 | SYSTOLIC BLOOD PRESSURE: 115 MMHG

## 2017-11-17 DIAGNOSIS — K80.12 CALCULUS OF GALLBLADDER WITH ACUTE ON CHRONIC CHOLECYSTITIS WITHOUT OBSTRUCTION: ICD-10-CM

## 2017-11-17 PROCEDURE — 74300 X-RAY BILE DUCTS/PANCREAS: CPT | Performed by: COLON & RECTAL SURGERY

## 2017-11-17 PROCEDURE — 0FT44ZZ RESECTION OF GALLBLADDER, PERCUTANEOUS ENDOSCOPIC APPROACH: ICD-10-PCS | Performed by: COLON & RECTAL SURGERY

## 2017-11-17 PROCEDURE — BF13YZZ FLUOROSCOPY OF GALLBLADDER AND BILE DUCTS USING OTHER CONTRAST: ICD-10-PCS | Performed by: COLON & RECTAL SURGERY

## 2017-11-17 PROCEDURE — 47563 LAPARO CHOLECYSTECTOMY/GRAPH: CPT | Performed by: COLON & RECTAL SURGERY

## 2017-11-17 RX ORDER — HYDROMORPHONE HYDROCHLORIDE 1 MG/ML
0.4 INJECTION, SOLUTION INTRAMUSCULAR; INTRAVENOUS; SUBCUTANEOUS EVERY 5 MIN PRN
Status: DISCONTINUED | OUTPATIENT
Start: 2017-11-17 | End: 2017-11-17

## 2017-11-17 RX ORDER — MEPERIDINE HYDROCHLORIDE 25 MG/ML
INJECTION INTRAMUSCULAR; INTRAVENOUS; SUBCUTANEOUS
Status: COMPLETED
Start: 2017-11-17 | End: 2017-11-17

## 2017-11-17 RX ORDER — DEXAMETHASONE SODIUM PHOSPHATE 4 MG/ML
4 VIAL (ML) INJECTION AS NEEDED
Status: DISCONTINUED | OUTPATIENT
Start: 2017-11-17 | End: 2017-11-17

## 2017-11-17 RX ORDER — TRAMADOL HYDROCHLORIDE 50 MG/1
50 TABLET ORAL
Qty: 30 TABLET | Refills: 0 | Status: SHIPPED | OUTPATIENT
Start: 2017-11-17 | End: 2017-12-08

## 2017-11-17 RX ORDER — SODIUM CHLORIDE, SODIUM LACTATE, POTASSIUM CHLORIDE, CALCIUM CHLORIDE 600; 310; 30; 20 MG/100ML; MG/100ML; MG/100ML; MG/100ML
INJECTION, SOLUTION INTRAVENOUS CONTINUOUS
Status: DISCONTINUED | OUTPATIENT
Start: 2017-11-17 | End: 2017-11-17

## 2017-11-17 RX ORDER — ONDANSETRON 2 MG/ML
4 INJECTION INTRAMUSCULAR; INTRAVENOUS AS NEEDED
Status: DISCONTINUED | OUTPATIENT
Start: 2017-11-17 | End: 2017-11-17

## 2017-11-17 RX ORDER — METOCLOPRAMIDE HYDROCHLORIDE 5 MG/ML
10 INJECTION INTRAMUSCULAR; INTRAVENOUS AS NEEDED
Status: DISCONTINUED | OUTPATIENT
Start: 2017-11-17 | End: 2017-11-17

## 2017-11-17 RX ORDER — MIDAZOLAM HYDROCHLORIDE 1 MG/ML
1 INJECTION INTRAMUSCULAR; INTRAVENOUS ONCE
Status: DISCONTINUED | OUTPATIENT
Start: 2017-11-17 | End: 2017-11-17

## 2017-11-17 RX ORDER — MEPERIDINE HYDROCHLORIDE 25 MG/ML
25 INJECTION INTRAMUSCULAR; INTRAVENOUS; SUBCUTANEOUS ONCE
Status: COMPLETED | OUTPATIENT
Start: 2017-11-17 | End: 2017-11-17

## 2017-11-17 RX ORDER — BUPIVACAINE HYDROCHLORIDE AND EPINEPHRINE 2.5; 5 MG/ML; UG/ML
INJECTION, SOLUTION EPIDURAL; INFILTRATION; INTRACAUDAL; PERINEURAL AS NEEDED
Status: DISCONTINUED | OUTPATIENT
Start: 2017-11-17 | End: 2017-11-17 | Stop reason: HOSPADM

## 2017-11-17 RX ORDER — HYDROCODONE BITARTRATE AND ACETAMINOPHEN 5; 325 MG/1; MG/1
1 TABLET ORAL AS NEEDED
Status: COMPLETED | OUTPATIENT
Start: 2017-11-17 | End: 2017-11-17

## 2017-11-17 RX ORDER — CEFOXITIN 1 G/1
INJECTION, POWDER, FOR SOLUTION INTRAVENOUS
Status: DISCONTINUED | OUTPATIENT
Start: 2017-11-17 | End: 2017-11-17

## 2017-11-17 RX ORDER — MIDAZOLAM HYDROCHLORIDE 1 MG/ML
INJECTION INTRAMUSCULAR; INTRAVENOUS
Status: COMPLETED
Start: 2017-11-17 | End: 2017-11-17

## 2017-11-17 RX ORDER — NALOXONE HYDROCHLORIDE 0.4 MG/ML
80 INJECTION, SOLUTION INTRAMUSCULAR; INTRAVENOUS; SUBCUTANEOUS AS NEEDED
Status: DISCONTINUED | OUTPATIENT
Start: 2017-11-17 | End: 2017-11-17

## 2017-11-17 RX ORDER — HYDROCODONE BITARTRATE AND ACETAMINOPHEN 5; 325 MG/1; MG/1
2 TABLET ORAL AS NEEDED
Status: COMPLETED | OUTPATIENT
Start: 2017-11-17 | End: 2017-11-17

## 2017-11-17 RX ORDER — HYDROMORPHONE HYDROCHLORIDE 1 MG/ML
INJECTION, SOLUTION INTRAMUSCULAR; INTRAVENOUS; SUBCUTANEOUS
Status: COMPLETED
Start: 2017-11-17 | End: 2017-11-17

## 2017-11-17 NOTE — OPERATIVE REPORT
[facility]  Operative Note    Abby Days Location: OR   [facility] [id number] MRN [id number]   Admission Date 11/17/2017 Operation Date 11/17/2017   Attending Physician Carlos Vasquez MD Operating Physician Rafal Mckenna MD       Patient Name: The base of the umbilical stalk was grasped with a Kocher clamp and elevated. The Veress needle was introduced into the abdominal cavity and pneumoperitoneum was achieved to a pressure of 15 mmHg. An 11 mm trocar was placed through this incision.   We int the gallbladder was elevated from the gallbladder fossa it was placed in an Endocatch specimen bag and set aside. Hemostasis on the gallbladder fossa was achieved with electrocautery.  The right upper quadrant was then copiously irrigated until the efflue

## 2017-11-17 NOTE — PROGRESS NOTES
Pt instructed to stop taking norco at home and to follow pain regimen set up by Dr. Abram Kaur.  Explained the benefits of the regimen to patient and friend, Patient verb understanding

## 2017-11-17 NOTE — ANESTHESIA POSTPROCEDURE EVALUATION
Yesi Allen 98 Patient Status:  Hospital Outpatient Surgery   Age/Gender 41year old female MRN [id number]   Location [address] [facility] Attending Michaelle Shook MD   Hosp Day # 0 PCP Susie Aguilera DO

## 2017-11-17 NOTE — H&P (VIEW-ONLY)
New Patient Visit Note       Active Problems      1. Calculus of gallbladder with acute on chronic cholecystitis without obstruction    2. RENE (generalized anxiety disorder)    3.  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 Medications include alprazolam, sertraline, topiramate, metformin. She does not take any blood thinning medications. Allergies  Helen Bob is allergic to adhesive tape.     Past Probiotic Product (ARIES-Q OR) Take by mouth. Disp:  Rfl:    HYDROcodone-acetaminophen 5-325 MG Oral Tab Take 1-2 tablets by mouth every 4 (four) hours as needed for Pain.  Disp: 20 tablet Rfl: 0   Cyanocobalamin (VITAMIN B12) 3000 MCG/ML Sublingual Liquid /73   Pulse 82   Temp 97.9 °F (36.6 °C) (Oral)   Ht 66\"   Wt 185 lb   LMP 11/03/2017   BMI 29.86 kg/m²   Physical Exam     Constitutional: Well nourished, well kempt  Eyes: PERRL, no scleral icterus  ENT: Nares moist, oropharynx clear  Neck: Supple, Collected:  11/13/2017  2:58 PM Status:  Final result    Ref Range & Units 11/13/17  2:58 PM   Glucose 70 - 99 mg/dL 84    BUN 8 - 20 mg/dL 9    Creatinine 0.55 - 1.02 mg/dL 0.76    GFR >=60 95    Comments:    Estimated GFR units: mL/min/1.73 square meters BILIARY:  There is cholelithiasis. There is moderate gallbladder wall thickening measuring up to 5 mm. The common bile but measures 6 mm. The Serna's sign is reported as negative. The largest gallstone measures 1.7 cm.   PANCREAS:  Normal.  SPLEEN:  Normal The benefits of surgical intervention to address the underlying pathology were discussed with the patient. The alternatives to surgery including non-operative treatment with symptoms control were discussed with the patient.  The risks of surgical interventi

## 2017-11-17 NOTE — ANESTHESIA PREPROCEDURE EVALUATION
PRE-OP EVALUATION    Patient Name: Zora Moore    Pre-op Diagnosis: Calculus of gallbladder with acute on chronic cholecystitis without obstruction [K80.12]    Procedure(s):  LAPAROSCOPIC CHOLECYSTECTOMY WITH CHOLANGIOGRAM    Surgeon(s) and Role:     * Arc ROS.                              Pulmonary    Negative pulmonary ROS. Neuro/Psych    Negative neuro/psych ROS.                           PCOS pelvic pain      Past Surgical History:  1996: OTHER SURGICAL HISTORY      Comment: cervical

## 2017-11-17 NOTE — INTERVAL H&P NOTE
Pre-op Diagnosis: Calculus of gallbladder with acute on chronic cholecystitis without obstruction [K80.12]    The above referenced H&P was reviewed by Bethany Manrique MD on 11/17/2017, the patient was examined and no significant changes have occurred

## 2017-11-27 ENCOUNTER — OFFICE VISIT (OUTPATIENT)
Dept: SURGERY | Facility: CLINIC | Age: 47
End: 2017-11-27

## 2017-11-27 ENCOUNTER — TELEPHONE (OUTPATIENT)
Dept: FAMILY MEDICINE CLINIC | Facility: CLINIC | Age: 47
End: 2017-11-27

## 2017-11-27 VITALS
DIASTOLIC BLOOD PRESSURE: 75 MMHG | TEMPERATURE: 99 F | HEIGHT: 66 IN | SYSTOLIC BLOOD PRESSURE: 109 MMHG | WEIGHT: 182 LBS | HEART RATE: 109 BPM | RESPIRATION RATE: 18 BRPM | BODY MASS INDEX: 29.25 KG/M2

## 2017-11-27 DIAGNOSIS — F41.1 GAD (GENERALIZED ANXIETY DISORDER): ICD-10-CM

## 2017-11-27 DIAGNOSIS — Z51.81 THERAPEUTIC DRUG MONITORING: ICD-10-CM

## 2017-11-27 DIAGNOSIS — R19.7 DIARRHEA, UNSPECIFIED TYPE: Primary | ICD-10-CM

## 2017-11-27 DIAGNOSIS — Z09 FOLLOW-UP EXAMINATION: ICD-10-CM

## 2017-11-27 PROCEDURE — 99213 OFFICE O/P EST LOW 20 MIN: CPT | Performed by: COLON & RECTAL SURGERY

## 2017-11-27 RX ORDER — ALPRAZOLAM 0.25 MG/1
TABLET ORAL
Qty: 45 TABLET | Refills: 0 | Status: SHIPPED | OUTPATIENT
Start: 2017-11-27 | End: 2018-02-19

## 2017-11-27 RX ORDER — PAROXETINE 10 MG/1
TABLET, FILM COATED ORAL
Qty: 45 TABLET | Refills: 0 | Status: SHIPPED | OUTPATIENT
Start: 2017-11-27 | End: 2017-12-08

## 2017-11-27 NOTE — PROGRESS NOTES
Post Operative Visit Note       Active Problems  1. Diarrhea, unspecified type    2.  Follow-up examination         Chief Complaint   Frequent watery diarrhea     History of Present Illness   Hailey العلي a 43-year-old woman who underwent lap scopic cholecys quadrant. The pain is alleviated with the Norco that she was prescribed in the emergency department. Pain is worse with eating and moving. The pain has been associated with multiple episodes of nonbloody nonbilious emesis.   She also complains of a recen cervical conization loop electrode excision    The family history and social history have been reviewed by me today.     Family History   Problem Relation Age of Onset   • idiopathic hyprtrophic subaortic stenosis [OTHER] Father    • Heart Disease Paternal daily with meals. (Patient taking differently: Take 1,000 mg by mouth 2 (two) times daily with meals. Takes Metformin for POS. Patient denies NIDDM ) Disp: 180 tablet Rfl: 1   Cholecalciferol (VITAMIN D) 1000 UNITS Oral Cap Take 2 capsules by mouth daily. not ready to come off yet however there was no obvious redness or drainage from these incisions  Skin: No rashes,  No lesions  Psych: Appropriate mood and affect, oriented x3      Case Report   Surgical Pathology                                Case: [id number] diarrhea. When she recovers further from her cholecystectomy and if her loose stools persist we can discuss further performing a colonoscopy to further evaluate her diarrhea.            Orders Placed This Encounter      WBC, Stool [[id number]]      Clostridium

## 2017-11-28 NOTE — PATIENT INSTRUCTIONS
Assessment   Follow-up examination  (primary encounter diagnosis)  Diarrhea, unspecified type    From the surgical standpoint the patient is doing well and her incisions are healing appropriately.     I do not think the patient's ongoing diarrhea is associa

## 2017-12-08 ENCOUNTER — OFFICE VISIT (OUTPATIENT)
Dept: FAMILY MEDICINE CLINIC | Facility: CLINIC | Age: 47
End: 2017-12-08

## 2017-12-08 VITALS
RESPIRATION RATE: 18 BRPM | BODY MASS INDEX: 29.15 KG/M2 | HEIGHT: 66 IN | HEART RATE: 72 BPM | WEIGHT: 181.38 LBS | SYSTOLIC BLOOD PRESSURE: 124 MMHG | DIASTOLIC BLOOD PRESSURE: 78 MMHG

## 2017-12-08 DIAGNOSIS — R10.13 EPIGASTRIC PAIN: ICD-10-CM

## 2017-12-08 DIAGNOSIS — Z51.81 THERAPEUTIC DRUG MONITORING: Primary | ICD-10-CM

## 2017-12-08 DIAGNOSIS — E66.3 OVERWEIGHT (BMI 25.0-29.9): ICD-10-CM

## 2017-12-08 DIAGNOSIS — F41.1 GAD (GENERALIZED ANXIETY DISORDER): ICD-10-CM

## 2017-12-08 DIAGNOSIS — R19.5 LOOSE STOOLS: ICD-10-CM

## 2017-12-08 PROCEDURE — 99214 OFFICE O/P EST MOD 30 MIN: CPT | Performed by: FAMILY MEDICINE

## 2017-12-08 RX ORDER — PAROXETINE 10 MG/1
10 TABLET, FILM COATED ORAL 2 TIMES DAILY
Qty: 60 TABLET | Refills: 0 | Status: SHIPPED | OUTPATIENT
Start: 2017-12-08 | End: 2018-01-24

## 2017-12-08 NOTE — PROGRESS NOTES
Esequiel Gerber is a 41year old female. HPI:     RENE:  About 80% better since starting the paroxetine. Taking paroxetine 10 mg twice a day. Started on 11/27/2017.   Infrequently taking the alprazolam.  No side effects to the Paroxetine noticed except for wh Bruxism     PCOS (polycystic ovarian syndrome)     Routine general medical examination at a health care facility     Heel pain, chronic     BMI 32.0-32.9,adult     Non morbid obesity due to excess calories     High risk medication use     Therapeutic drug Negative for dizziness, light-headedness and headaches.    Psychiatric/Behavioral:        As in HPI       EXAM:   /78 (BP Location: Left arm, Patient Position: Sitting, Cuff Size: adult)   Pulse 72   Resp 18   Ht 66\"   Wt 181 lb 6.4 oz   BMI 29.28 kg Tab; Take 1 tablet (10 mg total) by mouth 2 (two) times daily. Dispense: 60 tablet; Refill: 0    3. Epigastric pain  Intermittent, patient does not feel it is related to the gallbladder issue.   She notes the pain specifically after eating eggs and a coupl

## 2017-12-11 ENCOUNTER — APPOINTMENT (OUTPATIENT)
Dept: LAB | Age: 47
End: 2017-12-11
Attending: COLON & RECTAL SURGERY
Payer: COMMERCIAL

## 2017-12-11 PROCEDURE — 87077 CULTURE AEROBIC IDENTIFY: CPT | Performed by: EMERGENCY MEDICINE

## 2017-12-11 PROCEDURE — 87045 FECES CULTURE AEROBIC BACT: CPT | Performed by: EMERGENCY MEDICINE

## 2017-12-11 PROCEDURE — 87427 SHIGA-LIKE TOXIN AG IA: CPT | Performed by: EMERGENCY MEDICINE

## 2017-12-11 PROCEDURE — 87046 STOOL CULTR AEROBIC BACT EA: CPT | Performed by: EMERGENCY MEDICINE

## 2017-12-11 PROCEDURE — 82272 OCCULT BLD FECES 1-3 TESTS: CPT | Performed by: EMERGENCY MEDICINE

## 2017-12-11 PROCEDURE — 87493 C DIFF AMPLIFIED PROBE: CPT | Performed by: EMERGENCY MEDICINE

## 2017-12-26 ENCOUNTER — OFFICE VISIT (OUTPATIENT)
Dept: SURGERY | Facility: CLINIC | Age: 47
End: 2017-12-26

## 2017-12-26 VITALS
HEART RATE: 64 BPM | SYSTOLIC BLOOD PRESSURE: 134 MMHG | DIASTOLIC BLOOD PRESSURE: 90 MMHG | WEIGHT: 180 LBS | BODY MASS INDEX: 28.93 KG/M2 | TEMPERATURE: 99 F | HEIGHT: 66 IN

## 2017-12-26 DIAGNOSIS — Z09 FOLLOW-UP EXAMINATION: Primary | ICD-10-CM

## 2017-12-26 PROCEDURE — 99024 POSTOP FOLLOW-UP VISIT: CPT | Performed by: COLON & RECTAL SURGERY

## 2017-12-26 NOTE — PROGRESS NOTES
Post Operative Visit Note       Active Problems  1. Follow-up examination         Chief Complaint   No specific complaints     History of Present Illness   Pravin Andres is a 39yo woman who underwent lap marta on 11/17/17. She is here for follow up.     She s diet without nausea or vomiting.     Previous history included below  The patient presented to the emergency department on 11/13/2017 complaining of a one-week history of epigastric abdominal pain.  The pain progressed over that time and is now become cons headaches 3/16/2013    Secondary to #s 2,4,5    • RENE (generalized anxiety disorder) 7/10/2017   • Herpes zoster without complication 3/55/5491    3rd Episode    • Migraines    • Obesity 5/17/2015   • Overweight (BMI 25.0-29.9) 12/8/2017   • Polycystic ova tablet by mouth daily. Disp:  Rfl:    Vitamin C 500 MG Oral Tab Take 500 mg by mouth daily. Disp:  Rfl:    topiramate 50 MG Oral Tab Take 1 tablet (50 mg total) by mouth 2 (two) times daily.  Disp: 180 tablet Rfl: 1   MetFORMIN HCl 1000 MG Oral Tab Take 1 Nondistended, no masses, no hernias, well healed laparoscopy incisions  Skin: No rashes,  No lesions  Psych: Appropriate mood and affect, oriented x3       Assessment   Follow-up examination  (primary encounter diagnosis)    The patient is doing well since

## 2017-12-27 NOTE — PATIENT INSTRUCTIONS
Assessment   Follow-up examination  (primary encounter diagnosis)    The patient is doing well since surgery. Plan   OK to resume more strenuous activity. OK to swim. Continue regular diet. At this point the patient can follow up as needed.

## 2018-01-24 ENCOUNTER — TELEPHONE (OUTPATIENT)
Dept: FAMILY MEDICINE CLINIC | Facility: CLINIC | Age: 48
End: 2018-01-24

## 2018-01-24 DIAGNOSIS — Z71.3 ENCOUNTER FOR WEIGHT LOSS COUNSELING: ICD-10-CM

## 2018-01-24 DIAGNOSIS — Z51.81 THERAPEUTIC DRUG MONITORING: ICD-10-CM

## 2018-01-24 DIAGNOSIS — F41.1 GAD (GENERALIZED ANXIETY DISORDER): ICD-10-CM

## 2018-01-24 DIAGNOSIS — E66.09 NON MORBID OBESITY DUE TO EXCESS CALORIES: ICD-10-CM

## 2018-01-24 RX ORDER — TOPIRAMATE 50 MG/1
50 TABLET, FILM COATED ORAL 2 TIMES DAILY
Qty: 60 TABLET | Refills: 0 | Status: SHIPPED | OUTPATIENT
Start: 2018-01-24 | End: 2018-02-19

## 2018-01-24 RX ORDER — PAROXETINE 10 MG/1
10 TABLET, FILM COATED ORAL 2 TIMES DAILY
Qty: 60 TABLET | Refills: 0 | Status: SHIPPED | OUTPATIENT
Start: 2018-01-24 | End: 2018-03-12

## 2018-01-24 NOTE — TELEPHONE ENCOUNTER
PARoxetine HCl 10 MG Oral Tab  topiramate 50 MG Oral Tab   Refill please [facility]  Future Appointments  Date Time Provider Laura Heavenly   2/19/2018 1:30 PM Cliff Hernandez DO [facility] [place]

## 2018-02-14 ENCOUNTER — APPOINTMENT (OUTPATIENT)
Dept: LAB | Age: 48
End: 2018-02-14
Attending: FAMILY MEDICINE
Payer: COMMERCIAL

## 2018-02-14 DIAGNOSIS — R19.5 LOOSE STOOLS: ICD-10-CM

## 2018-02-14 DIAGNOSIS — R10.13 EPIGASTRIC PAIN: ICD-10-CM

## 2018-02-14 PROCEDURE — 36415 COLL VENOUS BLD VENIPUNCTURE: CPT

## 2018-02-14 PROCEDURE — 86003 ALLG SPEC IGE CRUDE XTRC EA: CPT

## 2018-02-16 LAB
ALLERGEN,  SHRIMP IGE: 0.25 KU/L
ALLERGEN, CLAM IGE: <0.1 KU/L
ALLERGEN, CODFISH: <0.1 KU/L
ALLERGEN, CORN IGE: <0.1 KU/L
ALLERGEN, EGG WHITE IGE: <0.1 KU/L
ALLERGEN, MILK (COW) IGE: <0.1 KU/L
ALLERGEN, PEANUT IGE: <0.1 KU/L
ALLERGEN, SCALLOP IGE: <0.1 KU/L
ALLERGEN, SOYBEAN IGE: <0.1 KU/L
ALLERGEN, WALNUT/BLACK WALNUT: <0.1 KU/L
ALLERGEN, WHEAT IGE: <0.1 KU/L
IMMUNOGLOBULIN E: 40 KU/L

## 2018-02-19 ENCOUNTER — OFFICE VISIT (OUTPATIENT)
Dept: FAMILY MEDICINE CLINIC | Facility: CLINIC | Age: 48
End: 2018-02-19

## 2018-02-19 VITALS
WEIGHT: 178 LBS | SYSTOLIC BLOOD PRESSURE: 112 MMHG | HEIGHT: 66 IN | DIASTOLIC BLOOD PRESSURE: 68 MMHG | HEART RATE: 80 BPM | BODY MASS INDEX: 28.61 KG/M2

## 2018-02-19 DIAGNOSIS — Z51.81 THERAPEUTIC DRUG MONITORING: Primary | ICD-10-CM

## 2018-02-19 DIAGNOSIS — F41.1 GAD (GENERALIZED ANXIETY DISORDER): ICD-10-CM

## 2018-02-19 DIAGNOSIS — F51.5 NIGHTMARES: ICD-10-CM

## 2018-02-19 DIAGNOSIS — Z80.3 FAMILY HISTORY OF BREAST CANCER: ICD-10-CM

## 2018-02-19 DIAGNOSIS — Z71.3 ENCOUNTER FOR WEIGHT LOSS COUNSELING: ICD-10-CM

## 2018-02-19 DIAGNOSIS — G44.209 ACUTE NON INTRACTABLE TENSION-TYPE HEADACHE: ICD-10-CM

## 2018-02-19 DIAGNOSIS — E66.3 OVERWEIGHT (BMI 25.0-29.9): ICD-10-CM

## 2018-02-19 DIAGNOSIS — E28.2 PCOS (POLYCYSTIC OVARIAN SYNDROME): ICD-10-CM

## 2018-02-19 PROCEDURE — 99214 OFFICE O/P EST MOD 30 MIN: CPT | Performed by: FAMILY MEDICINE

## 2018-02-19 RX ORDER — TOPIRAMATE 50 MG/1
50 TABLET, FILM COATED ORAL 2 TIMES DAILY
Qty: 60 TABLET | Refills: 0 | Status: SHIPPED | OUTPATIENT
Start: 2018-02-19 | End: 2018-03-12

## 2018-02-19 RX ORDER — PAROXETINE HYDROCHLORIDE 20 MG/1
TABLET, FILM COATED ORAL
Qty: 60 TABLET | Refills: 1 | Status: SHIPPED | OUTPATIENT
Start: 2018-02-19 | End: 2018-03-12

## 2018-02-19 RX ORDER — PAROXETINE 10 MG/1
10 TABLET, FILM COATED ORAL 2 TIMES DAILY
Qty: 60 TABLET | Refills: 0 | Status: CANCELLED | OUTPATIENT
Start: 2018-02-19

## 2018-02-19 RX ORDER — ALPRAZOLAM 0.25 MG/1
TABLET ORAL
Qty: 45 TABLET | Refills: 0 | Status: SHIPPED | OUTPATIENT
Start: 2018-02-19 | End: 2019-02-14

## 2018-02-19 NOTE — PROGRESS NOTES
Darrel Morrison is a 41year old female. HPI:     Anxiety:  Having flash backs of when she was younger re problems with her family. Father was an alcoholic. Mother and sister give her a hard time. H/o coma when she was 5years old for a month, almost . 29.0-29.9,adult     Overweight (BMI 25.0-29. 9)       Adhesive Tape               Comment:Tape takes skin off.  Paper tape ok   Past Medical History:   Diagnosis Date   • Anxiety state    • BMI 29.0-29.9,adult 12/8/2017   • Bruxism 3/1/2013   • Chronic heada encounter: 66\". Weight as of this encounter: 178 lb. Physical Exam   Nursing note and vitals reviewed. Constitutional: She is oriented to person, place, and time. She appears well-developed and well-nourished.    HENT:   Head: Normocephalic and atrau day.  Alprazolam as needed. Consult behavioral health navigator for therapist.    - ALPRAZolam 0.25 MG Oral Tab; 1 tab po 1-2 times a day prn anxiety  Dispense: 45 tablet;  Refill: 0  - PARoxetine HCl 20 MG Oral Tab; 20 mg twice a day  Dispense: 60 tablet; COUNSELING    Return in about 2 weeks (around 3/5/2018) for 2-3 weeks musculoskeletal pain and anxiety.

## 2018-02-20 ENCOUNTER — TELEPHONE (OUTPATIENT)
Dept: FAMILY MEDICINE CLINIC | Facility: CLINIC | Age: 48
End: 2018-02-20

## 2018-02-20 NOTE — TELEPHONE ENCOUNTER
----- Message from Sonido Browne DO sent at 2/19/2018 11:56 PM CST -----  Please call patient: On the food allergy test, only shrimp is minimally elevated. The minimal elevation on the shrimp is not considered significant.

## 2018-03-08 ENCOUNTER — TELEPHONE (OUTPATIENT)
Dept: FAMILY MEDICINE CLINIC | Facility: CLINIC | Age: 48
End: 2018-03-08

## 2018-03-08 NOTE — TELEPHONE ENCOUNTER
Pt said on Monday she was walking out of her bathroom and hit her face real hard on the door jam blacked out and fell backward on to the linoleum floor.   She said her teeth hit so hard she had to see her orthodontist and have the wires replaced on her brac

## 2018-03-08 NOTE — TELEPHONE ENCOUNTER
Dr Sterling Mins, do you want the patient to go to [facility] for the head injury

## 2018-03-12 ENCOUNTER — OFFICE VISIT (OUTPATIENT)
Dept: FAMILY MEDICINE CLINIC | Facility: CLINIC | Age: 48
End: 2018-03-12

## 2018-03-12 VITALS
BODY MASS INDEX: 28.34 KG/M2 | HEIGHT: 66 IN | RESPIRATION RATE: 16 BRPM | WEIGHT: 176.38 LBS | SYSTOLIC BLOOD PRESSURE: 110 MMHG | DIASTOLIC BLOOD PRESSURE: 64 MMHG

## 2018-03-12 DIAGNOSIS — F45.8 BRUXISM: ICD-10-CM

## 2018-03-12 DIAGNOSIS — M62.838 TRAPEZIUS MUSCLE SPASM: ICD-10-CM

## 2018-03-12 DIAGNOSIS — Z71.3 ENCOUNTER FOR WEIGHT LOSS COUNSELING: ICD-10-CM

## 2018-03-12 DIAGNOSIS — E66.3 OVERWEIGHT (BMI 25.0-29.9): ICD-10-CM

## 2018-03-12 DIAGNOSIS — M54.2 CERVICALGIA: Primary | ICD-10-CM

## 2018-03-12 DIAGNOSIS — F41.1 GAD (GENERALIZED ANXIETY DISORDER): ICD-10-CM

## 2018-03-12 PROCEDURE — 99214 OFFICE O/P EST MOD 30 MIN: CPT | Performed by: FAMILY MEDICINE

## 2018-03-12 RX ORDER — TOPIRAMATE 50 MG/1
50 TABLET, FILM COATED ORAL 2 TIMES DAILY
Qty: 180 TABLET | Refills: 1 | Status: SHIPPED | OUTPATIENT
Start: 2018-03-12 | End: 2018-12-18

## 2018-03-12 RX ORDER — TIZANIDINE HYDROCHLORIDE 4 MG/1
4 CAPSULE, GELATIN COATED ORAL NIGHTLY PRN
Qty: 30 CAPSULE | Refills: 0 | Status: SHIPPED | OUTPATIENT
Start: 2018-03-12 | End: 2019-03-28

## 2018-03-12 RX ORDER — PAROXETINE HYDROCHLORIDE 20 MG/1
TABLET, FILM COATED ORAL
Qty: 180 TABLET | Refills: 1 | Status: SHIPPED | OUTPATIENT
Start: 2018-03-12 | End: 2018-12-26

## 2018-03-12 NOTE — PROGRESS NOTES
Matthew Curling is a 41year old female. HPI:       Anxiety:  Paroxetine was increased at last appointment from 10 mg to 20 mg twice a day. Anxiety has improved. Patient declines therapy at this time. LOC a week ago today, at home.   Was up late but th ovarian syndrome    • Polycystic ovary    • TMJ arthropathy 3/1/2013      Past Surgical History:  11/15/2017: CHOLECYSTECTOMY  1996: OTHER SURGICAL HISTORY      Comment: cervical conization loop electrode excision   Social History:    Smoking status: Forme diagnosis)  Trapezius muscle spasm  Moose (generalized anxiety disorder)  Bruxism  Encounter for weight loss counseling  Overweight (bmi 25.0-29.9)      1. Cervicalgia  Trial of muscle relaxant at bedtime.   Patient provided handout on neck and upper back str Consults:  None  Influenza Vaccine(1) due on 09/01/2017  Return in about 6 weeks (around 4/23/2018) for 4-6 weeks for annual wellness visit.

## 2018-04-23 ENCOUNTER — OFFICE VISIT (OUTPATIENT)
Dept: FAMILY MEDICINE CLINIC | Facility: CLINIC | Age: 48
End: 2018-04-23

## 2018-04-23 VITALS
WEIGHT: 177 LBS | RESPIRATION RATE: 18 BRPM | HEART RATE: 88 BPM | BODY MASS INDEX: 28.45 KG/M2 | HEIGHT: 66 IN | DIASTOLIC BLOOD PRESSURE: 64 MMHG | SYSTOLIC BLOOD PRESSURE: 110 MMHG

## 2018-04-23 DIAGNOSIS — Z00.00 ROUTINE GENERAL MEDICAL EXAMINATION AT A HEALTH CARE FACILITY: Primary | ICD-10-CM

## 2018-04-23 DIAGNOSIS — Z00.00 LABORATORY EXAM ORDERED AS PART OF ROUTINE GENERAL MEDICAL EXAMINATION: ICD-10-CM

## 2018-04-23 DIAGNOSIS — Z12.31 ENCOUNTER FOR SCREENING MAMMOGRAM FOR MALIGNANT NEOPLASM OF BREAST: ICD-10-CM

## 2018-04-23 DIAGNOSIS — R53.82 CHRONIC FATIGUE: ICD-10-CM

## 2018-04-23 DIAGNOSIS — E55.9 VITAMIN D DEFICIENCY: ICD-10-CM

## 2018-04-23 DIAGNOSIS — N94.10 DYSPAREUNIA IN FEMALE: ICD-10-CM

## 2018-04-23 PROCEDURE — 99396 PREV VISIT EST AGE 40-64: CPT | Performed by: FAMILY MEDICINE

## 2018-04-23 NOTE — PROGRESS NOTES
HPI:   Shukri Farrar is a 41year old female who presents for her annual wellness visit. Symptoms: denies discharge, itching, burning or dysuria, periods are regular every 3 weeks.    Last PAP: 4/2017  Abnormal PAP: first pap was abnl approx 1993, had a LEEP RENE (generalized anxiety disorder) 7/10/2017   • Herpes zoster without complication 0/95/7861    3rd Episode    • Migraines    • Obesity 5/17/2015   • Overweight (BMI 25.0-29.9) 12/8/2017   • Polycystic ovarian syndrome    • Polycystic ovary    • TMJ arthr Wt 177 lb   LMP 04/10/2018   BMI 28.57 kg/m²   Body mass index is 28.57 kg/m².    GENERAL: NAD, Pleasant female  SKIN: no rashes,no suspicious lesions  HEENT: atraumatic, normocephalic,ears, nose and throat are normal, mmm  EYES: PERRLA, EOMI, sclera, conju Future    6. Laboratory exam ordered as part of routine general medical examination  - CBC WITH DIFFERENTIAL WITH PLATELET; Future  - COMP METABOLIC PANEL (14); Future  - LIPID PANEL;  Future  - TSH+FREE T4; Future  - VITAMIN D, 25-HYDROXY; Future

## 2018-04-23 NOTE — PATIENT INSTRUCTIONS
Routine Healthcare for Women   Routine checkups can find treatable problems early. For many medical problems, early treatment can help prevent more serious complications. The value of checkups and how often you have them depend mainly on your age.  Your history of high cholesterol. Colorectal cancer test: if you are 48 or older.  Recommended tests include a yearly test for blood in the stool, called the fecal occult blood test (FOBT) or fecal immunochemical test (FIT), and one of the following tests:   s history. Many other tests are often done at routine checkups, but there is no current evidence that they are helpful as routine screening tests for healthy women. Examples of such tests are a CBC (complete blood count), thyroid tests, and urine tests.  Wh medical condition, such as diabetes. Varicella (chickenpox) if you have never had chickenpox. Zoster (shingles) vaccine: if you are 50 or older. The vaccine can help prevent shingles. It can also reduce the pain caused by shingles.    What other things

## 2018-04-25 PROBLEM — Z00.00 ROUTINE GENERAL MEDICAL EXAMINATION AT A HEALTH CARE FACILITY: Status: ACTIVE | Noted: 2018-04-25

## 2018-04-25 PROBLEM — E66.09 CLASS 1 OBESITY DUE TO EXCESS CALORIES WITHOUT SERIOUS COMORBIDITY WITH BODY MASS INDEX (BMI) OF 31.0 TO 31.9 IN ADULT: Status: RESOLVED | Noted: 2017-08-14 | Resolved: 2018-04-25

## 2018-07-17 ENCOUNTER — APPOINTMENT (OUTPATIENT)
Dept: CT IMAGING | Age: 48
End: 2018-07-17
Attending: PHYSICIAN ASSISTANT
Payer: COMMERCIAL

## 2018-07-17 ENCOUNTER — APPOINTMENT (OUTPATIENT)
Dept: GENERAL RADIOLOGY | Age: 48
End: 2018-07-17
Attending: PHYSICIAN ASSISTANT
Payer: COMMERCIAL

## 2018-07-17 ENCOUNTER — HOSPITAL ENCOUNTER (OUTPATIENT)
Age: 48
Discharge: HOME | End: 2018-07-17
Payer: COMMERCIAL

## 2018-07-17 VITALS
DIASTOLIC BLOOD PRESSURE: 80 MMHG | HEART RATE: 90 BPM | SYSTOLIC BLOOD PRESSURE: 118 MMHG | BODY MASS INDEX: 26.2 KG/M2 | HEIGHT: 66 IN | RESPIRATION RATE: 18 BRPM | TEMPERATURE: 99 F | WEIGHT: 163 LBS | OXYGEN SATURATION: 99 %

## 2018-07-17 DIAGNOSIS — S93.402A SPRAIN OF LEFT ANKLE, UNSPECIFIED LIGAMENT, INITIAL ENCOUNTER: Primary | ICD-10-CM

## 2018-07-17 DIAGNOSIS — R55 SYNCOPE, UNSPECIFIED SYNCOPE TYPE: ICD-10-CM

## 2018-07-17 LAB
GLUCOSE BLD-MCNC: 91 MG/DL (ref 65–99)
POCT URINE PREGNANCY: NEGATIVE

## 2018-07-17 PROCEDURE — 73630 X-RAY EXAM OF FOOT: CPT | Performed by: PHYSICIAN ASSISTANT

## 2018-07-17 PROCEDURE — 99215 OFFICE O/P EST HI 40 MIN: CPT

## 2018-07-17 PROCEDURE — 73610 X-RAY EXAM OF ANKLE: CPT | Performed by: PHYSICIAN ASSISTANT

## 2018-07-17 PROCEDURE — 99214 OFFICE O/P EST MOD 30 MIN: CPT

## 2018-07-17 PROCEDURE — 73590 X-RAY EXAM OF LOWER LEG: CPT | Performed by: PHYSICIAN ASSISTANT

## 2018-07-17 PROCEDURE — 82962 GLUCOSE BLOOD TEST: CPT

## 2018-07-17 PROCEDURE — 81025 URINE PREGNANCY TEST: CPT | Performed by: PHYSICIAN ASSISTANT

## 2018-07-17 PROCEDURE — 70450 CT HEAD/BRAIN W/O DYE: CPT | Performed by: PHYSICIAN ASSISTANT

## 2018-07-17 NOTE — ED PROVIDER NOTES
Patient Seen in: [facility]    History   Patient presents with:   Ankle Injury  Head Injury    Stated Complaint: back out fall, injured left ankle, xtoday     HPI    Muna Yanez is a 68-year-old female who presents today for evaluatio Types: Cigarettes     Quit date: 1/1/2000  Smokeless tobacco: Never Used                      Alcohol use: Yes           0.0 oz/week     Comment: occ      Review of Systems    Positive for stated complaint: back out fall, injured left ankle, xtoday   Other normal.   Skin: Skin is warm and dry. She is not diaphoretic. Nursing note and vitals reviewed.       ED Course     Labs Reviewed   POCT PREGNANCY, URINE - Normal   POCT GLUCOSE - Normal     ED Course as of Jul 17 1835  ----------------------------------- VIEWS), LEFT (CPT=73590)  TECHNIQUE:  AP and lateral views of the tibia and fibula were obtained. COMPARISON:  None.   INDICATIONS:  back out fall, injured left ankle, xtoday  PATIENT STATED HISTORY: (As transcribed by Technologist)  Patient fell today and Lisa Bravo MD              MDM   Clinical impression: Left ankle sprain, syncope  Plan: Patient is informed of her imaging findings. She is instructed on R ICE and NSAID use.   She is strongly encouraged to follow-up with a neurologist that her prim

## 2018-07-17 NOTE — ED INITIAL ASSESSMENT (HPI)
Left ankle - pt was outside the  Picking up something  From the floor and stood up and felt she was going to faint.  Pt states she fell and head hit the hardwood floor  per pt  passed  out for a couple of seconds she states her daughter came to her,  she sa

## 2018-07-27 ENCOUNTER — TELEPHONE (OUTPATIENT)
Dept: FAMILY MEDICINE CLINIC | Facility: CLINIC | Age: 48
End: 2018-07-27

## 2018-07-27 NOTE — TELEPHONE ENCOUNTER
Pt would like to know if Juan Ziegler would lower her Topiramate 50MG to Topiramate 25MG. States the Topiramate 50MG makes her feel too foggy, patient has an appointment 8/17 for a follow up from urgent care due to patient having a black out episode.

## 2018-07-28 DIAGNOSIS — E66.3 OVERWEIGHT (BMI 25.0-29.9): ICD-10-CM

## 2018-07-28 DIAGNOSIS — Z71.3 ENCOUNTER FOR WEIGHT LOSS COUNSELING: ICD-10-CM

## 2018-07-30 RX ORDER — TOPIRAMATE 50 MG/1
TABLET, FILM COATED ORAL
Qty: 180 TABLET | Refills: 1 | OUTPATIENT
Start: 2018-07-30

## 2018-07-30 RX ORDER — TOPIRAMATE 25 MG/1
25 TABLET ORAL 2 TIMES DAILY
Qty: 180 TABLET | Refills: 0 | Status: SHIPPED | OUTPATIENT
Start: 2018-07-30 | End: 2018-12-18

## 2018-07-30 NOTE — TELEPHONE ENCOUNTER
Per phone consent left message for patient that it is ok per Dr Caitlyn Bear for her to reduce the topiramate to 25 mg bid

## 2018-10-03 ENCOUNTER — TELEPHONE (OUTPATIENT)
Dept: FAMILY MEDICINE CLINIC | Facility: CLINIC | Age: 48
End: 2018-10-03

## 2018-10-03 NOTE — TELEPHONE ENCOUNTER
Spoke to patient and gave her the name to Dr. Jewell Mendoza and phone number to contact.   Pt also states that she is refusing to have a mammogram done and that if she has to start with ob/gyne she will and go from there as she has an extensive family history of b

## 2018-10-03 NOTE — TELEPHONE ENCOUNTER
Mumtaz Parekh is calling to see what the name of the breast Dr she needs to see, she forgot the name and would like to get things rolling to have her breast removed.  Please call Neda Theodore at [phone]

## 2018-12-18 ENCOUNTER — TELEPHONE (OUTPATIENT)
Dept: FAMILY MEDICINE CLINIC | Facility: CLINIC | Age: 48
End: 2018-12-18

## 2018-12-18 ENCOUNTER — LAB ENCOUNTER (OUTPATIENT)
Dept: LAB | Age: 48
End: 2018-12-18
Attending: FAMILY MEDICINE
Payer: COMMERCIAL

## 2018-12-18 DIAGNOSIS — R53.82 CHRONIC FATIGUE: ICD-10-CM

## 2018-12-18 DIAGNOSIS — Z00.00 LABORATORY EXAM ORDERED AS PART OF ROUTINE GENERAL MEDICAL EXAMINATION: ICD-10-CM

## 2018-12-18 DIAGNOSIS — E55.9 VITAMIN D DEFICIENCY: ICD-10-CM

## 2018-12-18 PROCEDURE — 80053 COMPREHEN METABOLIC PANEL: CPT

## 2018-12-18 PROCEDURE — 85025 COMPLETE CBC W/AUTO DIFF WBC: CPT

## 2018-12-18 PROCEDURE — 36415 COLL VENOUS BLD VENIPUNCTURE: CPT

## 2018-12-18 PROCEDURE — 82306 VITAMIN D 25 HYDROXY: CPT

## 2018-12-18 PROCEDURE — 80061 LIPID PANEL: CPT

## 2018-12-18 PROCEDURE — 84443 ASSAY THYROID STIM HORMONE: CPT

## 2018-12-18 PROCEDURE — 82607 VITAMIN B-12: CPT

## 2018-12-18 PROCEDURE — 84439 ASSAY OF FREE THYROXINE: CPT

## 2018-12-18 RX ORDER — TOPIRAMATE 25 MG/1
25 TABLET ORAL 2 TIMES DAILY
Qty: 20 TABLET | Refills: 0 | Status: SHIPPED | OUTPATIENT
Start: 2018-12-18 | End: 2019-03-28

## 2018-12-18 NOTE — TELEPHONE ENCOUNTER
Pt wants to know if she can get enough topiramate 25 MG Oral Tab   to hold her until her appt on 12/26/18. She would like it sent to [facility] on [address].

## 2018-12-19 ENCOUNTER — TELEPHONE (OUTPATIENT)
Dept: FAMILY MEDICINE CLINIC | Facility: CLINIC | Age: 48
End: 2018-12-19

## 2018-12-19 NOTE — TELEPHONE ENCOUNTER
----- Message from Christian Rojo DO sent at 12/18/2018 10:59 PM CST -----  Please inform patient that blood test results are normal or okay. We will discuss this further at her upcoming appointment that she has already scheduled.

## 2018-12-26 ENCOUNTER — OFFICE VISIT (OUTPATIENT)
Dept: FAMILY MEDICINE CLINIC | Facility: CLINIC | Age: 48
End: 2018-12-26

## 2018-12-26 VITALS
WEIGHT: 159 LBS | HEART RATE: 87 BPM | SYSTOLIC BLOOD PRESSURE: 125 MMHG | BODY MASS INDEX: 26 KG/M2 | DIASTOLIC BLOOD PRESSURE: 71 MMHG

## 2018-12-26 DIAGNOSIS — F41.1 GAD (GENERALIZED ANXIETY DISORDER): ICD-10-CM

## 2018-12-26 DIAGNOSIS — R63.4 WEIGHT LOSS: ICD-10-CM

## 2018-12-26 DIAGNOSIS — Z51.81 THERAPEUTIC DRUG MONITORING: Primary | ICD-10-CM

## 2018-12-26 DIAGNOSIS — H91.92 HEARING LOSS OF LEFT EAR, UNSPECIFIED HEARING LOSS TYPE: ICD-10-CM

## 2018-12-26 PROCEDURE — 99214 OFFICE O/P EST MOD 30 MIN: CPT | Performed by: FAMILY MEDICINE

## 2018-12-26 RX ORDER — PAROXETINE HYDROCHLORIDE 20 MG/1
20 TABLET, FILM COATED ORAL 2 TIMES DAILY
Qty: 180 TABLET | Refills: 0 | Status: SHIPPED | OUTPATIENT
Start: 2018-12-26 | End: 2019-03-28

## 2018-12-26 NOTE — PROGRESS NOTES
Rachel Michele is a 57year old female. HPI:       Weight loss:  18# over the last 8 months.   Patient states the weight loss was mostly intentional.      Anxiety:  Worsening, situational.  Per patient she is concerned that in the next 1-2 days that she may 3rd Episode    • Migraines    • Obesity 5/17/2015   • Overweight (BMI 25.0-29.9) 12/8/2017   • Polycystic ovarian syndrome    • Polycystic ovary    • TMJ arthropathy 3/1/2013      Past Surgical History:   Procedure Laterality Date   • CHOLECYSTECTOMY  11/1 wheezes/ronchi/rales/crackles  CARDIO: RRR, +S1/S2, no mm/S3/S4  EXTREMITIES: No edema, no cyanosis, or clubbing  NEURO: Alert and Oriented x3, CN II-XII grossly intact, no focal weakness  PSYCH: Cries during part of interview.   Patient was presyncopal vas

## 2019-01-04 ENCOUNTER — TELEPHONE (OUTPATIENT)
Dept: FAMILY MEDICINE CLINIC | Facility: CLINIC | Age: 49
End: 2019-01-04

## 2019-01-04 NOTE — TELEPHONE ENCOUNTER
Larayazan Soliskatelyn would like a prescription refill on her Paxil sent to [address] on [address], she was just in to see Dr Page Prior and she forgot to tell her she needed a refill.

## 2019-01-04 NOTE — TELEPHONE ENCOUNTER
A refill of the paxil was sent to the patient's pharmacy on 12/26/2018 qty 90 days. Patient needs to contact the pharmacy for a refill. Tried calling the patient to let her know this but the phone continued to ring so was unable to leave a messge.     Arlet

## 2019-01-07 ENCOUNTER — TELEPHONE (OUTPATIENT)
Dept: FAMILY MEDICINE CLINIC | Facility: CLINIC | Age: 49
End: 2019-01-07

## 2019-01-07 NOTE — TELEPHONE ENCOUNTER
Pt came in and said she needs a refill on Topimate. She currently takes 25mg but she wants to know if she can get a lower dose. She said she is no longer using it for weight loss it's just for her migraines. She uses [facility] on [address].

## 2019-01-07 NOTE — TELEPHONE ENCOUNTER
What is the actual dose of the topiramate that she is currently taking? How many milligrams and how many times a day? And for how long has she been on the current dose?

## 2019-01-08 NOTE — TELEPHONE ENCOUNTER
Tried returning patient's call but there was no answer. Phone continued to ring.  Unable to leave message

## 2019-01-09 RX ORDER — TOPIRAMATE 25 MG/1
TABLET ORAL
Qty: 180 TABLET | Refills: 1 | Status: SHIPPED | OUTPATIENT
Start: 2019-01-09 | End: 2019-03-28

## 2019-01-09 NOTE — TELEPHONE ENCOUNTER
Called pt back but unable to leave a message. Phone keeps ringing. So tried work number and spoke to patient and she has not had any topirmate since Friday and her headaches are really bad.   Has had one for 3 days    Taking 25mg taking once a day and has

## 2019-01-09 NOTE — TELEPHONE ENCOUNTER
After discussing further with Dr. Rico Zarate she would also like pt to come in to discuss her migraine treatment at the next available appt.  Script sent to pharmacy and pt informed and she already has an appt scheduled for 1/21/19

## 2019-01-10 ENCOUNTER — TELEPHONE (OUTPATIENT)
Dept: FAMILY MEDICINE CLINIC | Facility: CLINIC | Age: 49
End: 2019-01-10

## 2019-01-10 RX ORDER — TOPIRAMATE 50 MG/1
TABLET, FILM COATED ORAL
Qty: 90 TABLET | Refills: 0 | OUTPATIENT
Start: 2019-01-10 | End: 2019-03-28

## 2019-01-10 NOTE — TELEPHONE ENCOUNTER
Pt called and said Topimate 25mg is on backorder. The pharmacy said 50mg is available and she can use that and break it in 1/2.

## 2019-01-10 NOTE — TELEPHONE ENCOUNTER
New rx has been phoned in to pharmacy for topiramate 50 mg  Disp qty 90 NR  Sig:Take 1/2 tab for 1 week, then increase to 1/2 tab bid

## 2019-02-14 DIAGNOSIS — Z51.81 THERAPEUTIC DRUG MONITORING: ICD-10-CM

## 2019-02-14 DIAGNOSIS — F41.1 GAD (GENERALIZED ANXIETY DISORDER): ICD-10-CM

## 2019-02-14 RX ORDER — ALPRAZOLAM 0.25 MG/1
TABLET ORAL
Qty: 45 TABLET | Refills: 0 | Status: SHIPPED | OUTPATIENT
Start: 2019-02-14 | End: 2019-03-28

## 2019-02-14 NOTE — TELEPHONE ENCOUNTER
Kayla Pendleton is calling to see if Dr Patricia Danielle can give her a prescription for Alprazolam, she lost her job yesterday and she is having a lot of anxiety, she would like it sent to the [facility] on [address], any questions please call Kayla Pendleton at [phone]

## 2019-03-28 ENCOUNTER — OFFICE VISIT (OUTPATIENT)
Dept: FAMILY MEDICINE CLINIC | Facility: CLINIC | Age: 49
End: 2019-03-28
Payer: MEDICAID

## 2019-03-28 VITALS
DIASTOLIC BLOOD PRESSURE: 58 MMHG | BODY MASS INDEX: 22.34 KG/M2 | SYSTOLIC BLOOD PRESSURE: 90 MMHG | HEART RATE: 104 BPM | HEIGHT: 66 IN | WEIGHT: 139 LBS

## 2019-03-28 DIAGNOSIS — M25.511 ACUTE PAIN OF RIGHT SHOULDER: ICD-10-CM

## 2019-03-28 DIAGNOSIS — R63.4 WEIGHT LOSS: ICD-10-CM

## 2019-03-28 DIAGNOSIS — M75.21 BICEPS TENDINITIS, RIGHT: ICD-10-CM

## 2019-03-28 DIAGNOSIS — F41.1 GAD (GENERALIZED ANXIETY DISORDER): ICD-10-CM

## 2019-03-28 DIAGNOSIS — Z51.81 THERAPEUTIC DRUG MONITORING: Primary | ICD-10-CM

## 2019-03-28 DIAGNOSIS — R55 SYNCOPE, UNSPECIFIED SYNCOPE TYPE: ICD-10-CM

## 2019-03-28 PROCEDURE — 99214 OFFICE O/P EST MOD 30 MIN: CPT | Performed by: FAMILY MEDICINE

## 2019-03-28 RX ORDER — ALPRAZOLAM 0.25 MG/1
0.25 TABLET ORAL 2 TIMES DAILY PRN
Qty: 60 TABLET | Refills: 0 | Status: SHIPPED | OUTPATIENT
Start: 2019-03-28 | End: 2019-04-29

## 2019-03-28 RX ORDER — PAROXETINE HYDROCHLORIDE 20 MG/1
20 TABLET, FILM COATED ORAL 2 TIMES DAILY
Qty: 180 TABLET | Refills: 0 | Status: SHIPPED | OUTPATIENT
Start: 2019-03-28 | End: 2019-04-29

## 2019-03-28 NOTE — PROGRESS NOTES
Blanca Tyson is a 57year old female. HPI:       Anxiety:  C/o losing consciousness d/t anxiety, 3 days in a row about 2 weeks ago, duration is a couple of minutes. Pt states the episodes were witnessed.   Pt states  left the house about a week ago a a health care facility     Hearing loss of left ear     Acute pain of right shoulder     Biceps tendinitis, right     Syncope     Weight loss       Adhesive Tape               Comment:Tape takes skin off.  Paper tape ok   Past Medical History:   Diagnosis D Height as of this encounter: 66\". Weight as of this encounter: 139 lb. Physical Exam   Nursing note and vitals reviewed. Constitutional: She is oriented to person, place, and time. She appears well-developed and well-nourished. No distress.    Neurol 0    3. Acute pain of right shoulder/Biceps tendinitis, right  Biceps tendinitis versus other. Physical therapist to evaluate and treat. Okay to try OTC analgesics as needed. - [facility]FERRAL [facility]    4.  Syncope, unspecified

## 2019-04-29 PROBLEM — M25.561 ACUTE PAIN OF RIGHT KNEE: Status: ACTIVE | Noted: 2019-04-29

## 2019-04-29 PROBLEM — M25.461 EFFUSION OF RIGHT KNEE: Status: ACTIVE | Noted: 2019-04-29

## 2019-04-29 PROBLEM — F39 MOOD DISORDER (HCC): Status: ACTIVE | Noted: 2019-04-29

## 2019-04-29 PROBLEM — R63.4 WEIGHT LOSS, NON-INTENTIONAL: Status: ACTIVE | Noted: 2019-04-29

## 2019-04-29 NOTE — PROGRESS NOTES
Shanda Luis is a 57year old female. HPI:     Roomer's notes read and reviewed with pt.     Anxiety:  Patient states that prior to examiner entering room she was vaping, when speaking with patient at one point she says she is vaping cannabis at another p electrode excision      Social History:    Social History    Tobacco Use      Smoking status: Passive Smoke Exposure - Never Smoker      Smokeless tobacco: Never Used    Alcohol use:  Yes      Alcohol/week: 0.0 oz      Comment: occ    Drug use: Yes      Com tangential thinking or derailment jumping from one complaint to another very quickly. Mild to moderately pressured speech.           ASSESSMENT AND PLAN:       Moose (generalized anxiety disorder)  (primary encounter diagnosis)  Mood disorder (hcc)  Weight l

## 2019-05-08 ENCOUNTER — TELEPHONE (OUTPATIENT)
Dept: FAMILY MEDICINE CLINIC | Facility: CLINIC | Age: 49
End: 2019-05-08

## 2019-05-09 NOTE — TELEPHONE ENCOUNTER
Everett Donnelly sent to DO [address]     I have left several messages for [facility] with my contact information in an effort to help with behavioral health navigation, but I have not received a response.  I am closing the orde

## 2019-05-20 ENCOUNTER — APPOINTMENT (OUTPATIENT)
Dept: GENERAL RADIOLOGY | Age: 49
End: 2019-05-20
Attending: FAMILY MEDICINE
Payer: MEDICAID

## 2019-05-20 ENCOUNTER — HOSPITAL ENCOUNTER (OUTPATIENT)
Age: 49
Discharge: HOME | End: 2019-05-20
Attending: FAMILY MEDICINE
Payer: MEDICAID

## 2019-05-20 ENCOUNTER — LAB ENCOUNTER (OUTPATIENT)
Dept: LAB | Age: 49
End: 2019-05-20
Attending: FAMILY MEDICINE
Payer: MEDICAID

## 2019-05-20 VITALS
TEMPERATURE: 98 F | BODY MASS INDEX: 20.57 KG/M2 | HEIGHT: 66 IN | HEART RATE: 74 BPM | WEIGHT: 128 LBS | OXYGEN SATURATION: 100 % | RESPIRATION RATE: 18 BRPM | SYSTOLIC BLOOD PRESSURE: 124 MMHG | DIASTOLIC BLOOD PRESSURE: 68 MMHG

## 2019-05-20 DIAGNOSIS — F39 MOOD DISORDER (HCC): ICD-10-CM

## 2019-05-20 DIAGNOSIS — M77.8 RIGHT WRIST TENDINITIS: ICD-10-CM

## 2019-05-20 DIAGNOSIS — R63.4 WEIGHT LOSS, NON-INTENTIONAL: ICD-10-CM

## 2019-05-20 DIAGNOSIS — G56.01 RIGHT CARPAL TUNNEL SYNDROME: Primary | ICD-10-CM

## 2019-05-20 PROCEDURE — 73110 X-RAY EXAM OF WRIST: CPT | Performed by: FAMILY MEDICINE

## 2019-05-20 PROCEDURE — 82607 VITAMIN B-12: CPT

## 2019-05-20 PROCEDURE — 80053 COMPREHEN METABOLIC PANEL: CPT

## 2019-05-20 PROCEDURE — 73130 X-RAY EXAM OF HAND: CPT | Performed by: FAMILY MEDICINE

## 2019-05-20 PROCEDURE — 36415 COLL VENOUS BLD VENIPUNCTURE: CPT

## 2019-05-20 PROCEDURE — 99213 OFFICE O/P EST LOW 20 MIN: CPT

## 2019-05-20 PROCEDURE — 84439 ASSAY OF FREE THYROXINE: CPT

## 2019-05-20 PROCEDURE — 81025 URINE PREGNANCY TEST: CPT | Performed by: FAMILY MEDICINE

## 2019-05-20 PROCEDURE — 85025 COMPLETE CBC W/AUTO DIFF WBC: CPT

## 2019-05-20 PROCEDURE — 90471 IMMUNIZATION ADMIN: CPT

## 2019-05-20 PROCEDURE — 84443 ASSAY THYROID STIM HORMONE: CPT

## 2019-05-20 NOTE — ED INITIAL ASSESSMENT (HPI)
Pt. States she was holding/carrying a metal bench about 2 weeks ago, it jostled with Rt. Hand. Pt. C/o pain to Rt. Hand (Rt. 5th finger), Rt. 5th metacarpal & into Rt. Wrist/forearm. Pt. Is Rt. Hand dominant.  Pt. States she fell while walking her dog afte

## 2019-05-22 NOTE — ED PROVIDER NOTES
Patient Seen in: [facility]    History   Patient presents with:  Hand Injury: Rt.  Wrist Pain: Rt.    Stated Complaint: r hand pain    HPI    57year old female presents for right wrist and hand pain.  States she was carrying a m (Room air)       Current:/68   Pulse 74   Temp 98.3 °F (36.8 °C) (Oral)   Resp 18   Ht 167.6 cm (5' 6\")   Wt 58.1 kg   LMP 04/09/2019 (Approximate)   SpO2 100%   BMI 20.66 kg/m²         Physical Exam   Constitutional: She is oriented to person, plac

## 2019-05-24 ENCOUNTER — TELEPHONE (OUTPATIENT)
Dept: FAMILY MEDICINE CLINIC | Facility: CLINIC | Age: 49
End: 2019-05-24

## 2019-05-24 NOTE — TELEPHONE ENCOUNTER
----- Message from Gabriella Thomason DO sent at 5/23/2019  6:32 PM CDT -----  Results reviewed. Tests show no significant abnormalities. Please inform patient.

## 2019-06-13 ENCOUNTER — TELEPHONE (OUTPATIENT)
Dept: FAMILY MEDICINE CLINIC | Facility: CLINIC | Age: 49
End: 2019-06-13

## 2019-06-13 DIAGNOSIS — Z80.3 FAMILY HISTORY OF BREAST CANCER: Primary | ICD-10-CM

## 2019-06-13 NOTE — TELEPHONE ENCOUNTER
Referral was put in system on 2/19/18.  Will need a new referral placed in system if pt will be making appt to see Dr. Camron Ortiz

## 2019-06-13 NOTE — TELEPHONE ENCOUNTER
Karen Connie came in to let the office know that her referral to get her Genetics testing for the breast cancer testing does not have enough information on it. She tried to make her appt but they would not make it. Because of the information on it.  Please call Boubacar Macias

## 2019-06-14 NOTE — TELEPHONE ENCOUNTER
Referral for genetic counselor has been reordered for diagnosis family history of breast cancer. Diagnosis of breast cancer was removed, this patient does not have breast cancer and does not have a history of breast cancer.

## 2019-06-14 NOTE — TELEPHONE ENCOUNTER
New referral placed. Patient aware. She was unsure what further information was needed. She will get back to us if more information is needed.

## 2019-08-12 ENCOUNTER — HOSPITAL ENCOUNTER (OUTPATIENT)
Age: 49
Discharge: ANOTHER HOSPITAL | DRG: 690 | End: 2019-08-12
Attending: FAMILY MEDICINE
Payer: MEDICAID

## 2019-08-12 ENCOUNTER — HOSPITAL ENCOUNTER (INPATIENT)
Facility: HOSPITAL | Age: 49
LOS: 1 days | Discharge: HOME | DRG: 690 | End: 2019-08-14
Attending: EMERGENCY MEDICINE | Admitting: HOSPITALIST
Payer: MEDICAID

## 2019-08-12 ENCOUNTER — APPOINTMENT (OUTPATIENT)
Dept: CT IMAGING | Facility: HOSPITAL | Age: 49
DRG: 690 | End: 2019-08-12
Attending: EMERGENCY MEDICINE
Payer: MEDICAID

## 2019-08-12 ENCOUNTER — APPOINTMENT (OUTPATIENT)
Dept: GENERAL RADIOLOGY | Facility: HOSPITAL | Age: 49
DRG: 690 | End: 2019-08-12
Attending: EMERGENCY MEDICINE
Payer: MEDICAID

## 2019-08-12 VITALS
DIASTOLIC BLOOD PRESSURE: 91 MMHG | HEART RATE: 98 BPM | SYSTOLIC BLOOD PRESSURE: 116 MMHG | TEMPERATURE: 103 F | WEIGHT: 141 LBS | OXYGEN SATURATION: 100 % | BODY MASS INDEX: 23 KG/M2 | RESPIRATION RATE: 18 BRPM

## 2019-08-12 DIAGNOSIS — R55 SYNCOPE, UNSPECIFIED SYNCOPE TYPE: ICD-10-CM

## 2019-08-12 DIAGNOSIS — R10.32 ABDOMINAL PAIN, LEFT LOWER QUADRANT: Primary | ICD-10-CM

## 2019-08-12 DIAGNOSIS — N83.202 CYST OF LEFT OVARY: ICD-10-CM

## 2019-08-12 DIAGNOSIS — R55 SYNCOPE AND COLLAPSE: Primary | ICD-10-CM

## 2019-08-12 DIAGNOSIS — N30.00 ACUTE CYSTITIS WITHOUT HEMATURIA: ICD-10-CM

## 2019-08-12 DIAGNOSIS — R50.9 ACUTE FEBRILE ILLNESS: ICD-10-CM

## 2019-08-12 LAB
#MXD IC: 0.9 X10ˆ3/UL (ref 0.1–1)
ALBUMIN SERPL-MCNC: 3.7 G/DL (ref 3.4–5)
ALBUMIN/GLOB SERPL: 1.2 {RATIO} (ref 1–2)
ALP LIVER SERPL-CCNC: 49 U/L (ref 39–100)
ALT SERPL-CCNC: 13 U/L (ref 13–56)
ANION GAP SERPL CALC-SCNC: 5 MMOL/L (ref 0–18)
AST SERPL-CCNC: 13 U/L (ref 15–37)
BASOPHILS # BLD AUTO: 0.01 X10(3) UL (ref 0–0.2)
BASOPHILS # BLD AUTO: 0.01 X10(3) UL (ref 0–0.2)
BASOPHILS NFR BLD AUTO: 0.1 %
BASOPHILS NFR BLD AUTO: 0.1 %
BILIRUB SERPL-MCNC: 0.5 MG/DL (ref 0.1–2)
BILIRUB UR QL STRIP.AUTO: NEGATIVE
BUN BLD-MCNC: 9 MG/DL (ref 7–18)
BUN/CREAT SERPL: 9.8 (ref 10–20)
CALCIUM BLD-MCNC: 9 MG/DL (ref 8.5–10.1)
CHLORIDE SERPL-SCNC: 105 MMOL/L (ref 98–112)
CO2 SERPL-SCNC: 27 MMOL/L (ref 21–32)
COLOR UR AUTO: YELLOW
CREAT BLD-MCNC: 0.8 MG/DL (ref 0.55–1.02)
CREAT BLD-MCNC: 0.92 MG/DL (ref 0.55–1.02)
DEPRECATED RDW RBC AUTO: 42 FL (ref 35.1–46.3)
DEPRECATED RDW RBC AUTO: 42.1 FL (ref 35.1–46.3)
EOSINOPHIL # BLD AUTO: 0.02 X10(3) UL (ref 0–0.7)
EOSINOPHIL # BLD AUTO: 0.04 X10(3) UL (ref 0–0.7)
EOSINOPHIL NFR BLD AUTO: 0.1 %
EOSINOPHIL NFR BLD AUTO: 0.3 %
ERYTHROCYTE [DISTWIDTH] IN BLOOD BY AUTOMATED COUNT: 13 % (ref 11–15)
ERYTHROCYTE [DISTWIDTH] IN BLOOD BY AUTOMATED COUNT: 13.2 % (ref 11–15)
GLOBULIN PLAS-MCNC: 3.1 G/DL (ref 2.8–4.4)
GLUCOSE BLD-MCNC: 89 MG/DL (ref 70–99)
GLUCOSE BLD-MCNC: 95 MG/DL (ref 70–99)
GLUCOSE UR STRIP.AUTO-MCNC: NEGATIVE MG/DL
HCT VFR BLD AUTO: 32.3 % (ref 35–48)
HCT VFR BLD AUTO: 33.7 % (ref 35–48)
HCT VFR BLD AUTO: 34.6 % (ref 35–48)
HGB BLD-MCNC: 10.7 G/DL (ref 12–16)
HGB BLD-MCNC: 11.3 G/DL (ref 12–16)
HGB BLD-MCNC: 11.4 G/DL (ref 12–16)
IMM GRANULOCYTES # BLD AUTO: 0.06 X10(3) UL (ref 0–1)
IMM GRANULOCYTES # BLD AUTO: 0.1 X10(3) UL (ref 0–1)
IMM GRANULOCYTES NFR BLD: 0.4 %
IMM GRANULOCYTES NFR BLD: 0.6 %
ISTAT BUN: 9 MG/DL (ref 8–20)
ISTAT CHLORIDE: 100 MMOL/L (ref 101–111)
ISTAT HEMATOCRIT: 34 % (ref 34–50)
ISTAT IONIZED CALCIUM FOR CHEM 8: 1.19 MMOL/L (ref 1.12–1.32)
ISTAT POTASSIUM: 3.6 MMOL/L (ref 3.6–5.1)
ISTAT SODIUM: 137 MMOL/L (ref 136–145)
LACTATE SERPL-SCNC: 0.6 MMOL/L (ref 0.4–2)
LYMPHOCYTES # BLD AUTO: 0.7 X10ˆ3/UL (ref 1–4)
LYMPHOCYTES # BLD AUTO: 0.72 X10(3) UL (ref 1–4)
LYMPHOCYTES # BLD AUTO: 0.78 X10(3) UL (ref 1–4)
LYMPHOCYTES NFR BLD AUTO: 5 %
LYMPHOCYTES NFR BLD AUTO: 5.3 %
LYMPHOCYTES NFR BLD AUTO: 5.3 %
M PROTEIN MFR SERPL ELPH: 6.8 G/DL (ref 6.4–8.2)
MCH RBC QN AUTO: 28.8 PG (ref 26–34)
MCH RBC QN AUTO: 29 PG (ref 26–34)
MCH RBC QN AUTO: 29.4 PG (ref 26–34)
MCHC RBC AUTO-ENTMCNC: 32.7 G/DL (ref 31–37)
MCHC RBC AUTO-ENTMCNC: 33.1 G/DL (ref 31–37)
MCHC RBC AUTO-ENTMCNC: 33.8 G/DL (ref 31–37)
MCV RBC AUTO: 86.9 FL (ref 80–100)
MCV RBC AUTO: 87.5 FL (ref 80–100)
MCV RBC AUTO: 88 FL (ref 80–100)
MIXED CELL %: 7.2 %
MONOCYTES # BLD AUTO: 0.83 X10(3) UL (ref 0.1–1)
MONOCYTES # BLD AUTO: 0.84 X10(3) UL (ref 0.1–1)
MONOCYTES NFR BLD AUTO: 5.4 %
MONOCYTES NFR BLD AUTO: 6.2 %
NEUTROPHILS # BLD AUTO: 11.4 X10ˆ3/UL (ref 1.5–7.7)
NEUTROPHILS # BLD AUTO: 11.85 X10 (3) UL (ref 1.5–7.7)
NEUTROPHILS # BLD AUTO: 11.85 X10(3) UL (ref 1.5–7.7)
NEUTROPHILS # BLD AUTO: 13.74 X10 (3) UL (ref 1.5–7.7)
NEUTROPHILS # BLD AUTO: 13.74 X10(3) UL (ref 1.5–7.7)
NEUTROPHILS NFR BLD AUTO: 87.5 %
NEUTROPHILS NFR BLD AUTO: 87.7 %
NEUTROPHILS NFR BLD AUTO: 88.8 %
NITRITE UR QL STRIP.AUTO: POSITIVE
OSMOLALITY SERPL CALC.SUM OF ELEC: 282 MOSM/KG (ref 275–295)
PH UR STRIP.AUTO: 7 [PH] (ref 4.5–8)
PLATELET # BLD AUTO: 227 10(3)UL (ref 150–450)
PLATELET # BLD AUTO: 229 10(3)UL (ref 150–450)
POCT LOT NUMBER: NORMAL
POCT URINE PREGNANCY: NEGATIVE
POTASSIUM SERPL-SCNC: 3.6 MMOL/L (ref 3.5–5.1)
PROT UR STRIP.AUTO-MCNC: 30 MG/DL
RBC # BLD AUTO: 3.69 X10(6)UL (ref 3.8–5.3)
RBC # BLD AUTO: 3.88 X10ˆ6/UL (ref 3.8–5.3)
RBC # BLD AUTO: 3.93 X10(6)UL (ref 3.8–5.3)
RBC #/AREA URNS AUTO: >10 /HPF
SODIUM SERPL-SCNC: 137 MMOL/L (ref 136–145)
SP GR UR STRIP.AUTO: 1.01 (ref 1–1.03)
UROBILINOGEN UR STRIP.AUTO-MCNC: <2 MG/DL
WBC # BLD AUTO: 13 X10ˆ3/UL (ref 4–11)
WBC # BLD AUTO: 13.5 X10(3) UL (ref 4–11)
WBC # BLD AUTO: 15.5 X10(3) UL (ref 4–11)
WBC #/AREA URNS AUTO: >50 /HPF
WBC CLUMPS UR QL AUTO: PRESENT

## 2019-08-12 PROCEDURE — 72040 X-RAY EXAM NECK SPINE 2-3 VW: CPT | Performed by: EMERGENCY MEDICINE

## 2019-08-12 PROCEDURE — 71046 X-RAY EXAM CHEST 2 VIEWS: CPT | Performed by: EMERGENCY MEDICINE

## 2019-08-12 PROCEDURE — 99214 OFFICE O/P EST MOD 30 MIN: CPT

## 2019-08-12 PROCEDURE — 85025 COMPLETE CBC W/AUTO DIFF WBC: CPT | Performed by: FAMILY MEDICINE

## 2019-08-12 PROCEDURE — 80047 BASIC METABLC PNL IONIZED CA: CPT

## 2019-08-12 PROCEDURE — 99223 1ST HOSP IP/OBS HIGH 75: CPT | Performed by: HOSPITALIST

## 2019-08-12 PROCEDURE — 36415 COLL VENOUS BLD VENIPUNCTURE: CPT

## 2019-08-12 PROCEDURE — 74177 CT ABD & PELVIS W/CONTRAST: CPT | Performed by: EMERGENCY MEDICINE

## 2019-08-12 RX ORDER — SODIUM CHLORIDE 9 MG/ML
1000 INJECTION, SOLUTION INTRAVENOUS ONCE
Status: COMPLETED | OUTPATIENT
Start: 2019-08-12 | End: 2019-08-12

## 2019-08-12 RX ORDER — MORPHINE SULFATE 4 MG/ML
4 INJECTION, SOLUTION INTRAMUSCULAR; INTRAVENOUS ONCE
Status: COMPLETED | OUTPATIENT
Start: 2019-08-12 | End: 2019-08-12

## 2019-08-12 RX ORDER — SULFAMETHOXAZOLE AND TRIMETHOPRIM 800; 160 MG/1; MG/1
1 TABLET ORAL 2 TIMES DAILY
Qty: 14 TABLET | Refills: 0 | Status: ON HOLD | OUTPATIENT
Start: 2019-08-12 | End: 2019-08-13

## 2019-08-12 RX ORDER — MORPHINE SULFATE 4 MG/ML
INJECTION, SOLUTION INTRAMUSCULAR; INTRAVENOUS
Status: COMPLETED
Start: 2019-08-12 | End: 2019-08-12

## 2019-08-12 RX ORDER — ACETAMINOPHEN 500 MG
1000 TABLET ORAL ONCE
Status: COMPLETED | OUTPATIENT
Start: 2019-08-12 | End: 2019-08-12

## 2019-08-12 NOTE — ED INITIAL ASSESSMENT (HPI)
LLQ abd. pain, fainted (?x2) this afternoon at home. took temp 102.2 at home. Denies being ill in the past few days. C/o head pressure, necl pain.  A&Ox4.

## 2019-08-12 NOTE — ED PROVIDER NOTES
Patient Seen in: [facility]    History   Patient presents with:  Fall (musculoskeletal, neurologic)  Fever (infectious): 102.2  Headache (neurologic)  Neck Pain (musculoskeletal, neurologic)  Abdomen/Flank Pain (GI/): LLQ    S Packs/day: 0.50        Years: 4.00        Pack years: 2        Types: Cigarettes        Quit date: 2000        Years since quittin.6      Smokeless tobacco: Never Used    Alcohol use:  Yes      Alcohol/week: 0.0 standard drinks      Comment: occ Order Comments:              Saline lock      iStat (Chem 8)      0.9% NaCl infusion      Ambulance called to transfer her over to the [facility] (as per patient request she wants to go to [facility]).  She will need further testing and evaluation for abdominal p

## 2019-08-12 NOTE — ED PROVIDER NOTES
Patient Seen in: [facility] Emergency Department    History   Patient presents with:  Abdomen/Flank Pain (GI/)  Fever (infectious)  Headache (neurologic)    Stated Complaint: LLQ abd. pain, fainted (?x2) this afternoon at home.  took temp 102.2 at Tobacco Use      Smoking status: Former Smoker        Packs/day: 0.50        Years: 4.00        Pack years: 2        Types: Cigarettes        Quit date: 2000        Years since quittin.6      Smokeless tobacco: Never Used    Alcohol use:  Yes Reviewed   URINALYSIS WITH CULTURE REFLEX - Abnormal; Notable for the following components:       Result Value    Clarity Urine Hazy (*)     Ketones Urine Trace (*)     Blood Urine Moderate (*)     Protein Urine 30  (*)     Nitrite Urine Positive (*)     L lateral chest radiographs were obtained. PATIENT STATED HISTORY: (As transcribed by Technologist)  Patient presents to ED with reports of fainting 2 times today, fever, and left lower abdominal pain. FINDINGS:  LUNGS:  No focal consolidation.   Normal v at home. took temp 102.2 at home. Denies being ill in the past few days. C/o head pressure, necl pain. A&Ox4.   TECHNIQUE:  CT scanning was performed from the dome of the diaphragm to the pubic symphysis with non-ionic intravenous contrast material. Post co evaluation. An infusion of normal saline was initiated. Patient was observed while the laboratory and radiology studies were obtained. Results of the patient's laboratory and radiology studies were reviewed and discussed with the patient.   IV Rocephin w
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
## 2019-08-13 ENCOUNTER — APPOINTMENT (OUTPATIENT)
Dept: CV DIAGNOSTICS | Facility: HOSPITAL | Age: 49
DRG: 690 | End: 2019-08-13
Attending: HOSPITALIST
Payer: MEDICAID

## 2019-08-13 ENCOUNTER — APPOINTMENT (OUTPATIENT)
Dept: ULTRASOUND IMAGING | Facility: HOSPITAL | Age: 49
DRG: 690 | End: 2019-08-13
Attending: OBSTETRICS & GYNECOLOGY
Payer: MEDICAID

## 2019-08-13 PROBLEM — N30.00 ACUTE CYSTITIS WITHOUT HEMATURIA: Status: ACTIVE | Noted: 2019-08-13

## 2019-08-13 PROBLEM — N83.202 CYST OF LEFT OVARY: Status: ACTIVE | Noted: 2019-08-13

## 2019-08-13 LAB
ANION GAP SERPL CALC-SCNC: 7 MMOL/L (ref 0–18)
BASOPHILS # BLD AUTO: 0.01 X10(3) UL (ref 0–0.2)
BASOPHILS NFR BLD AUTO: 0.1 %
BUN BLD-MCNC: 9 MG/DL (ref 7–18)
BUN/CREAT SERPL: 12.9 (ref 10–20)
CALCIUM BLD-MCNC: 8.4 MG/DL (ref 8.5–10.1)
CHLORIDE SERPL-SCNC: 106 MMOL/L (ref 98–112)
CO2 SERPL-SCNC: 25 MMOL/L (ref 21–32)
CREAT BLD-MCNC: 0.7 MG/DL (ref 0.55–1.02)
DEPRECATED RDW RBC AUTO: 41.6 FL (ref 35.1–46.3)
EOSINOPHIL # BLD AUTO: 0.01 X10(3) UL (ref 0–0.7)
EOSINOPHIL NFR BLD AUTO: 0.1 %
ERYTHROCYTE [DISTWIDTH] IN BLOOD BY AUTOMATED COUNT: 13.2 % (ref 11–15)
GLUCOSE BLD-MCNC: 94 MG/DL (ref 70–99)
HCT VFR BLD AUTO: 30.7 % (ref 35–48)
HGB BLD-MCNC: 10 G/DL (ref 12–16)
IMM GRANULOCYTES # BLD AUTO: 0.04 X10(3) UL (ref 0–1)
IMM GRANULOCYTES NFR BLD: 0.3 %
LYMPHOCYTES # BLD AUTO: 1.19 X10(3) UL (ref 1–4)
LYMPHOCYTES NFR BLD AUTO: 9.6 %
MCH RBC QN AUTO: 28.5 PG (ref 26–34)
MCHC RBC AUTO-ENTMCNC: 32.6 G/DL (ref 31–37)
MCV RBC AUTO: 87.5 FL (ref 80–100)
MONOCYTES # BLD AUTO: 1.02 X10(3) UL (ref 0.1–1)
MONOCYTES NFR BLD AUTO: 8.2 %
NEUTROPHILS # BLD AUTO: 10.17 X10 (3) UL (ref 1.5–7.7)
NEUTROPHILS # BLD AUTO: 10.17 X10(3) UL (ref 1.5–7.7)
NEUTROPHILS NFR BLD AUTO: 81.7 %
OSMOLALITY SERPL CALC.SUM OF ELEC: 284 MOSM/KG (ref 275–295)
PLATELET # BLD AUTO: 178 10(3)UL (ref 150–450)
POTASSIUM SERPL-SCNC: 3.2 MMOL/L (ref 3.5–5.1)
POTASSIUM SERPL-SCNC: 4.4 MMOL/L (ref 3.5–5.1)
RBC # BLD AUTO: 3.51 X10(6)UL (ref 3.8–5.3)
SODIUM SERPL-SCNC: 138 MMOL/L (ref 136–145)
WBC # BLD AUTO: 12.4 X10(3) UL (ref 4–11)

## 2019-08-13 PROCEDURE — 93306 TTE W/DOPPLER COMPLETE: CPT | Performed by: HOSPITALIST

## 2019-08-13 PROCEDURE — 76856 US EXAM PELVIC COMPLETE: CPT | Performed by: OBSTETRICS & GYNECOLOGY

## 2019-08-13 PROCEDURE — 99252 IP/OBS CONSLTJ NEW/EST SF 35: CPT | Performed by: OBSTETRICS & GYNECOLOGY

## 2019-08-13 PROCEDURE — 99232 SBSQ HOSP IP/OBS MODERATE 35: CPT | Performed by: HOSPITALIST

## 2019-08-13 PROCEDURE — 76830 TRANSVAGINAL US NON-OB: CPT | Performed by: OBSTETRICS & GYNECOLOGY

## 2019-08-13 RX ORDER — POTASSIUM CHLORIDE 20 MEQ/1
40 TABLET, EXTENDED RELEASE ORAL EVERY 4 HOURS
Status: COMPLETED | OUTPATIENT
Start: 2019-08-13 | End: 2019-08-13

## 2019-08-13 RX ORDER — ACETAMINOPHEN 325 MG/1
650 TABLET ORAL EVERY 6 HOURS PRN
Status: DISCONTINUED | OUTPATIENT
Start: 2019-08-13 | End: 2019-08-13

## 2019-08-13 RX ORDER — TRAMADOL HYDROCHLORIDE 50 MG/1
50 TABLET ORAL EVERY 6 HOURS PRN
Status: DISCONTINUED | OUTPATIENT
Start: 2019-08-13 | End: 2019-08-14

## 2019-08-13 RX ORDER — ONDANSETRON 2 MG/ML
4 INJECTION INTRAMUSCULAR; INTRAVENOUS EVERY 6 HOURS PRN
Status: DISCONTINUED | OUTPATIENT
Start: 2019-08-13 | End: 2019-08-14

## 2019-08-13 RX ORDER — SODIUM CHLORIDE 9 MG/ML
INJECTION, SOLUTION INTRAVENOUS CONTINUOUS
Status: ACTIVE | OUTPATIENT
Start: 2019-08-13 | End: 2019-08-13

## 2019-08-13 RX ORDER — ACETAMINOPHEN 325 MG/1
650 TABLET ORAL EVERY 6 HOURS PRN
Status: DISCONTINUED | OUTPATIENT
Start: 2019-08-13 | End: 2019-08-14

## 2019-08-13 RX ORDER — METOCLOPRAMIDE HYDROCHLORIDE 5 MG/ML
10 INJECTION INTRAMUSCULAR; INTRAVENOUS EVERY 8 HOURS PRN
Status: DISCONTINUED | OUTPATIENT
Start: 2019-08-13 | End: 2019-08-14

## 2019-08-13 RX ORDER — SODIUM CHLORIDE 9 MG/ML
INJECTION, SOLUTION INTRAVENOUS CONTINUOUS
Status: DISCONTINUED | OUTPATIENT
Start: 2019-08-13 | End: 2019-08-14

## 2019-08-13 RX ORDER — ONDANSETRON 2 MG/ML
4 INJECTION INTRAMUSCULAR; INTRAVENOUS EVERY 4 HOURS PRN
Status: DISCONTINUED | OUTPATIENT
Start: 2019-08-13 | End: 2019-08-13

## 2019-08-13 NOTE — PROGRESS NOTES
08/13/19 0327 08/13/19 0330 08/13/19 0332   Vital Signs   /61 107/68 96/63   BP Location Left arm Left arm Left arm   BP Method Automatic Automatic Automatic   Patient Position Lying Sitting Standing     Orthostatic negative

## 2019-08-13 NOTE — CONSULTS
[facility]  Consultation Note      Andrews Backers Patient Status:  Observation    1970 MRN [id number]   [facility] 8NE-A Attending Yvrose Stewart MD   Hosp Day # 0 PCP Herson England DO     Outpatient cardiologist: Intravenous Q24H       Past Medical History:   Diagnosis Date   • Anxiety state    • BMI 29.0-29.9,adult 12/8/2017   • Bruxism 3/1/2013   • Carpal tunnel syndrome     R hand   • Chronic headaches 3/16/2013    Secondary to #s 2,4,5    • Endometriosis    • G bleeding  Musculoskeletal:negative for myalgias  Neurological: negative for dizziness and headaches  Endocrine: negative for temperature intolerance      Physical Exam:  Blood pressure 100/56, pulse 90, temperature 100 °F (37.8 °C), temperature source Oral questions.     Veronica Villafana MD  8/13/2019  3:23 PM
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
## 2019-08-13 NOTE — PLAN OF CARE
Assumed care at 0730 this AM. Pt continues to c/o pelvic pain, refusing any medications at this time. Pt stated \"I don't want to take anything for it. I don't like taking medications. \" OB/GYN consulted, STAT US pelvis ordered.  Spoke with US and stated th at baseline  Description  INTERVENTIONS:  - Continuous cardiac monitoring, monitor vital signs, obtain 12 lead EKG if indicated  - Evaluate effectiveness of antiarrhythmic and heart rate control medications as ordered  - Initiate emergency measures for lif

## 2019-08-13 NOTE — H&P
[facility]  History and Physical     Damian Solis Patient Status:  Observation    1970 MRN [id number]   [facility] 8NE-A Attending Irwin Story MD   Hosp Day # 0 PCP Kathy Tijerina DO     Chief Complaint: Abdominal pain    H she drinks alcohol. She reports that she has current or past drug history.     Family History:   Family History   Problem Relation Age of Onset   • Other (idiopathic hyprtrophic subaortic stenosis) Father    • Heart Disease Paternal Grandmother    • Heart D Labs:  Recent Labs   Lab 08/12/19  1548 08/12/19  1702   WBC 13.5* 15.5*   HGB 11.3* 10.7*   MCV 86.9  88.0 87.5   .0 227.0       Recent Labs   Lab 08/12/19  1603 08/12/19  1702   GLU  --  89   BUN  --  9   CREATSERUM  --  0.92   GFRAA 102 86

## 2019-08-13 NOTE — PLAN OF CARE
8/13/2019    Assumed care of pt @ 0400. Admission process was completed by other night shift nurse. Pt resting in bed.  Will cont to monitor    POC: labs in am , consults, iv fluids

## 2019-08-13 NOTE — PROGRESS NOTES
[facility]  Progress Note     Anny [place] Patient Status:  Observation    1970 MRN [id number]   [facility] 8NE-A Attending Dipika Jean Baptiste MD   Hosp Day # 0 PCP Eloy Davey DO     Chief Complaint: abdominal pain    S: Yanci Delgado (based on SCr of 0.7 mg/dL). No results for input(s): PTP, INR in the last 168 hours. No results for input(s): TROP, CK in the last 168 hours. Imaging: Imaging data reviewed in Epic.     Medications:   • cefTRIAXone  1 g Intravenous Q24H   • P

## 2019-08-13 NOTE — CONSULTS
[facility]  Obstetrics and Gynecology Consultation     Damian Solis Patient Status:  Observation    1970 MRN [id number]   [facility] 8NE-A Attending Divya José MD   Hospital Day 0 PCP Kathy Tijerina DO     Reason for Cons (generalized anxiety disorder) 7/10/2017   • Herpes zoster without complication 7/70/2443    3rd Episode    • Migraines    • Obesity 5/17/2015   • Overweight (BMI 25.0-29.9) 12/8/2017   • Polycystic ovarian syndrome    • Polycystic ovary    • Rheumatoid ar on file        Relationship status: Not on file      Intimate partner violence:        Fear of current or ex partner: Not on file        Emotionally abused: Not on file        Physically abused: Not on file        Forced sexual activity: Not on file    Oth sight, smell, hearing or taste.  Denies seizures or tremors  Immunological:  denies allergies, denies anaphylaxis, or swollen lymph nodes  Musculoskeletal:  denies joint pain, morning stiffness, decreased range of motion       Objective:      08/13/19  1231 is mild loss of C6-C7 at C5-C6 disc spaces. Small marginal osteophytes are noted. The open-mouth views are unremarkable. IMPRESSION: Unremarkable radiographs of the cervical spine. Mild osteoarthritic changes in the lower cervical spine.   Dictated by: Ginette Dietrich with her hx, her PCO does not seem to be symptomatic at this time. I recommend ovarian regulation but she refuses this. I discussed with her other therapy would be addressed on an out patient basis. Her febrile illness, seems to be urinary related.

## 2019-08-13 NOTE — PROGRESS NOTES
Admitted to the floor w/ fevers s/p syncopal episode  Now w/ 101.5 temp and head/neck pain, MD paged

## 2019-08-13 NOTE — DIETARY NOTE
[facility]   CLINICAL NUTRITION    Nilsa Mcrae     Admitting diagnosis:  Syncope and collapse [R55]  Cyst of left ovary [N83.202]  Acute cystitis without hematuria [N30.00]    Ht: 167.6 cm (5' 6\")  Wt: 65.4 kg (144 lb 2.9 oz).  This is 110% of IBW  Bod

## 2019-08-13 NOTE — PROGRESS NOTES
Addendum:    Ultrasound shows small hemorrhagic cyst of left ovary. This is a normal finding. Recommend to follow up out patient after her acute febrile illness is treated. At that time we can address long term options for recurrent ovarian cysts.

## 2019-08-14 VITALS
HEIGHT: 66 IN | WEIGHT: 144.19 LBS | OXYGEN SATURATION: 100 % | RESPIRATION RATE: 18 BRPM | HEART RATE: 83 BPM | DIASTOLIC BLOOD PRESSURE: 41 MMHG | TEMPERATURE: 98 F | BODY MASS INDEX: 23.17 KG/M2 | SYSTOLIC BLOOD PRESSURE: 100 MMHG

## 2019-08-14 LAB
ATRIAL RATE: 87 BPM
P AXIS: 66 DEGREES
P-R INTERVAL: 144 MS
Q-T INTERVAL: 350 MS
QRS DURATION: 74 MS
QTC CALCULATION (BEZET): 421 MS
R AXIS: 90 DEGREES
T AXIS: 58 DEGREES
VENTRICULAR RATE: 87 BPM

## 2019-08-14 PROCEDURE — 99239 HOSP IP/OBS DSCHRG MGMT >30: CPT | Performed by: HOSPITALIST

## 2019-08-14 RX ORDER — IBUPROFEN 400 MG/1
400 TABLET ORAL EVERY 6 HOURS PRN
Status: DISCONTINUED | OUTPATIENT
Start: 2019-08-14 | End: 2019-08-14

## 2019-08-14 RX ORDER — CEPHALEXIN 500 MG/1
500 CAPSULE ORAL EVERY 12 HOURS SCHEDULED
Status: DISCONTINUED | OUTPATIENT
Start: 2019-08-15 | End: 2019-08-14

## 2019-08-14 RX ORDER — CEPHALEXIN 500 MG/1
500 CAPSULE ORAL EVERY 12 HOURS SCHEDULED
Qty: 9 CAPSULE | Refills: 0 | Status: SHIPPED | OUTPATIENT
Start: 2019-08-15 | End: 2019-10-28 | Stop reason: ALTCHOICE

## 2019-08-14 NOTE — PROGRESS NOTES
[facility]  Progress Note     Orlando Archer Patient Status:  Observation    1970 MRN [id number]   [facility] 8NE-A Attending Joan Dean MD   Hosp Day # 1 PCP Lambert Campos DO     Chief Complaint: abdominal pain    S: \"I ne --   --        Estimated Creatinine Clearance: 94 mL/min (based on SCr of 0.7 mg/dL). No results for input(s): PTP, INR in the last 168 hours. No results for input(s): TROP, CK in the last 168 hours. Imaging: Imaging data reviewed in Epic.

## 2019-08-14 NOTE — DISCHARGE PLANNING
Discharge     Patient cleared for discharge by all services. Discharge education and handout provided to patient. Follow-up appointments reviewed. All questions answered. Patient verbalized understanding. Telemetry discontinued.   All belongings se

## 2019-08-14 NOTE — PAYOR COMM NOTE
--------------  ADMISSION REVIEW     Payor: Orlando Barriga #:  [id number]  Authorization Number: [id number]    Admit date: 8/13/19  Admit time: 1       Admitting Physician: Anthony Faulkner MD  Attending Physician: Result Value    Clarity Urine Hazy (*)     Ketones Urine Trace (*)     Blood Urine Moderate (*)     Protein Urine 30  (*)     Nitrite Urine Positive (*)     Leukocyte Esterase Urine Large (*)     WBC Urine >50 (*)     RBC URINE >10 (*)     Bacteria Urine Plan     Clinical Impression:  Syncope and collapse  (primary encounter diagnosis)  Cyst of left ovary  Acute cystitis without hematuria      Present on Admission  Date Reviewed: 4/29/2019          ICD-10-CM Noted POA    Syncope and collapse R55 8/12/2019 patient reports pain that caused her to pass out, but also notes a markedly elevated temperature.  . Patient denies vaginal discharge except started her menses today,  notes + dyspareunia, +  dysmenorrhea, denies incontinence of urine or stool and breast co — — —    08/14/19 0129 103.4 °F (39.7 °C)Abnormal  — — — — — — — [facility]   08/14/19 0006 102.8 °F (39.3 °C)Abnormal  — — — — — — — [facility]       NO LABS TODAY      ATTENDING -     Chief Complaint: abdominal pain     S: \"I need my ovaries removed\" \"I don't belie

## 2019-08-14 NOTE — PROGRESS NOTES
[facility] Progress Note        Malini Herron Patient Status:  Inpatient    1970 MRN [id number]   [facility] 8NE-A Attending Dunia Dean MD   Hosp Day # 1 PCP Justyn La DO     Subjective:   The p 229.0 227.0 178.0       Chem:  Recent Labs   Lab 08/12/19  1702 08/13/19  0519 08/13/19  1910    138  --    K 3.6 3.2* 4.4    106  --    CO2 27.0 25.0  --    BUN 9 9  --    CREATSERUM 0.92 0.70  --    CA 9.0 8.4*  --    GLU 89 94  --        Rec

## 2019-08-14 NOTE — PLAN OF CARE
IVF infusing  Rocephin IVPB  K 4.4 on recheck  A/O x 4  Room air  Low grade temps  NSR  On her period right now  Voiding in bathroom  Tramadol prn for pain- see MAR  Up with standby assist  Vital signs stable    Update @ 0015- temp 102.8, RN provided beckie

## 2019-08-14 NOTE — PLAN OF CARE
AOx4.  Anxious at times. Expressed to this RN how she \"feels her life has been hard. Said her \" used to come home with a \"blue barrel\" like kristian segovia\".  Also expressed how she is sad her \"kids resent her for not forgiving their father fo decreased coronary artery perfusion - ex.  Angina  - Evaluate fluid balance, assess for edema, trend weights  Outcome: Progressing  Goal: Absence of cardiac arrhythmias or at baseline  Description  INTERVENTIONS:  - Continuous cardiac monitoring, monitor vi

## 2019-08-15 ENCOUNTER — PATIENT OUTREACH (OUTPATIENT)
Dept: CASE MANAGEMENT | Age: 49
End: 2019-08-15

## 2019-08-15 ENCOUNTER — TELEPHONE (OUTPATIENT)
Dept: FAMILY MEDICINE CLINIC | Facility: CLINIC | Age: 49
End: 2019-08-15

## 2019-08-15 DIAGNOSIS — R55 SYNCOPE AND COLLAPSE: ICD-10-CM

## 2019-08-15 DIAGNOSIS — N83.202 CYST OF LEFT OVARY: ICD-10-CM

## 2019-08-15 DIAGNOSIS — N30.00 ACUTE CYSTITIS WITHOUT HEMATURIA: ICD-10-CM

## 2019-08-15 PROCEDURE — 1111F DSCHRG MED/CURRENT MED MERGE: CPT

## 2019-08-15 NOTE — PAYOR COMM NOTE
--------------  DISCHARGE REVIEW    Payor: Orlando Nithya #:  [id number]  Authorization Number: [id number]    Admit date: 8/13/19  Admit time:  1005  Discharge Date: 8/14/2019  4:58 PM     Admitting Physician: Shanita Good vasovagal etiology. Found to have UTI with persistent fevers. Patient saw OB/Gyn to discuss options for PCOS and will follow with them as an outpatient. She will complete course of antibiotics.      Lace+ Score: 28  59-90 High Risk  29-58 Medium Risk  0-28 MD on 8/15/2019  7:56 AM

## 2019-08-15 NOTE — DISCHARGE SUMMARY
[facility]  DISCHARGE SUMMARY     Wade Oakes Patient Status:  Inpatient    1970 MRN [id number]   [facility] 8NE-A Attending No att. providers found   Hosp Day # 1 PCP Denise Hare DO     Date of Admission: 2019  Date of these medications      Instructions Prescription details   cephALEXin 500 MG Caps  Commonly known as:  KEFLEX      Take 1 capsule (500 mg total) by mouth every 12 (twelve) hours.    Quantity:  9 capsule  Refills:  0        CONTINUE taking these medications

## 2019-08-15 NOTE — TELEPHONE ENCOUNTER
Patient discharged from [facility] on 8/14/19 and recommended to have a HFU non-TCM by 8/28/19. NCM attempted to schedule HFU, patient states she will call to schedule a HFU with Dr. Reshma Cash when she can check her work schedule.  Patient reports she mi

## 2019-08-15 NOTE — PROGRESS NOTES
Condition Update Post Discharge   Discharge Date: 8/14/19  Contact Date: 8/15/2019    Consent Verification:  Assessment Completed With: Patient  HIPAA Verified? Yes    General:   • How have you been since your discharge from the hospital/facility?  Ghazala you have prior to your hospital stay? yes  • Were you given a specific diet to follow at discharge? no   Are you having any concerns with constipation?  No    Discharge Instructions:    • TCM Nurse Care manager reviewed AVS with patient:  Yes  • When you get her schedule until Monday 8/19/19. Message sent to MD's office. Have you made all of your follow up appointments? no    Is there any reason as to why you cannot make your appointments?    No     NCM Reviewed upcoming Specialist Appt with patient

## 2019-08-26 ENCOUNTER — TELEPHONE (OUTPATIENT)
Dept: FAMILY MEDICINE CLINIC | Facility: CLINIC | Age: 49
End: 2019-08-26

## 2019-08-26 DIAGNOSIS — Z80.3 FAMILY HISTORY OF BREAST CANCER: Primary | ICD-10-CM

## 2019-08-26 DIAGNOSIS — Z12.31 ENCOUNTER FOR SCREENING MAMMOGRAM FOR MALIGNANT NEOPLASM OF BREAST: ICD-10-CM

## 2019-08-26 NOTE — TELEPHONE ENCOUNTER
Pt called on 8/24 5:40pm c/o severe lower abdominal pain and requesting pain medication for it. Said she has a known ovarian cyst and thinks it's in the process of rupturing. Is taking Tylenol ES and Ibuprofen 600mg ATC and asking for more relief.  Denies-

## 2019-08-26 NOTE — TELEPHONE ENCOUNTER
Zacarias Oviedo is calling to get a new referral for her Breast cancer genetics testing, she has one in the system but it is , she also needs to get her mammogram done but she is not sure if her referral is valid.  She also wants to let Dr Ada Cordero know that s

## 2019-10-28 ENCOUNTER — OFFICE VISIT (OUTPATIENT)
Dept: FAMILY MEDICINE CLINIC | Facility: CLINIC | Age: 49
End: 2019-10-28
Payer: MEDICAID

## 2019-10-28 VITALS
DIASTOLIC BLOOD PRESSURE: 74 MMHG | OXYGEN SATURATION: 100 % | RESPIRATION RATE: 16 BRPM | HEIGHT: 66 IN | HEART RATE: 78 BPM | WEIGHT: 160 LBS | BODY MASS INDEX: 25.71 KG/M2 | TEMPERATURE: 98 F | SYSTOLIC BLOOD PRESSURE: 118 MMHG

## 2019-10-28 DIAGNOSIS — F41.1 GAD (GENERALIZED ANXIETY DISORDER): ICD-10-CM

## 2019-10-28 DIAGNOSIS — F39 MOOD DISORDER (HCC): ICD-10-CM

## 2019-10-28 DIAGNOSIS — G43.909 MIGRAINE WITHOUT STATUS MIGRAINOSUS, NOT INTRACTABLE, UNSPECIFIED MIGRAINE TYPE: ICD-10-CM

## 2019-10-28 DIAGNOSIS — Z51.81 THERAPEUTIC DRUG MONITORING: Primary | ICD-10-CM

## 2019-10-28 PROCEDURE — 99214 OFFICE O/P EST MOD 30 MIN: CPT | Performed by: FAMILY MEDICINE

## 2019-10-28 RX ORDER — TOPIRAMATE 50 MG/1
50 TABLET, FILM COATED ORAL 2 TIMES DAILY
COMMUNITY
End: 2019-10-28

## 2019-10-28 RX ORDER — PAROXETINE 10 MG/1
10 TABLET, FILM COATED ORAL 2 TIMES DAILY
Qty: 60 TABLET | Refills: 0 | Status: SHIPPED | OUTPATIENT
Start: 2019-10-28 | End: 2019-10-28

## 2019-10-28 RX ORDER — TOPIRAMATE 50 MG/1
50 TABLET, FILM COATED ORAL 2 TIMES DAILY
Qty: 180 TABLET | Refills: 1 | Status: SHIPPED | OUTPATIENT
Start: 2019-10-28 | End: 2020-03-25

## 2019-10-28 RX ORDER — PAROXETINE 10 MG/1
10 TABLET, FILM COATED ORAL 2 TIMES DAILY
Qty: 60 TABLET | Refills: 1 | Status: SHIPPED | OUTPATIENT
Start: 2019-10-28 | End: 2019-10-28

## 2019-10-28 RX ORDER — PAROXETINE 10 MG/1
10 TABLET, FILM COATED ORAL 2 TIMES DAILY
Qty: 60 TABLET | Refills: 1 | Status: SHIPPED | OUTPATIENT
Start: 2019-10-28 | End: 2019-11-11

## 2019-10-28 RX ORDER — TOPIRAMATE 50 MG/1
50 TABLET, FILM COATED ORAL 2 TIMES DAILY
Qty: 180 TABLET | Refills: 1 | Status: SHIPPED | OUTPATIENT
Start: 2019-10-28 | End: 2019-10-28

## 2019-10-28 RX ORDER — ACETAMINOPHEN 160 MG
2000 TABLET,DISINTEGRATING ORAL DAILY
COMMUNITY
End: 2019-11-11 | Stop reason: DRUGHIGH

## 2019-10-28 RX ORDER — FAMOTIDINE 20 MG
1 TABLET ORAL
COMMUNITY
End: 2020-07-13 | Stop reason: ALTCHOICE

## 2019-10-28 NOTE — PATIENT INSTRUCTIONS
-Please schedule your screening mammogram.    -Start the paroxetine one tab daily for 1-2 weeks before increasing to 1 tab twice a day.

## 2019-10-28 NOTE — PROGRESS NOTES
Darryl Bose is a 54year old female. HPI:         Mood disorder:  Feels she is doing somewhat better overall. Moved out of state for a few weeks and now is back staying with a friend. Has a new job.   Patient states her children since the divorce do no Laterality Date   • CHOLECYSTECTOMY  11/15/2017   • LAPAROSCOPIC CHOLECYSTECTOMY N/A 11/17/2017    Performed by Tali Taveras MD at [facility] MAIN OR   • OTHER SURGICAL HISTORY  1996    cervical conization loop electrode excision      Social History: and Oriented x3, CN II-XII grossly intact, no focal weakness  PSYCH: Mildly pressured speech, improved from last office visit.           ASSESSMENT AND PLAN:       Therapeutic drug monitoring  (primary encounter diagnosis)  Migraine without status migrainos Sig: Take 1 tablet (10 mg total) by mouth 2 (two) times daily. Return in about 4 weeks (around 11/25/2019) for 4-6 weeks for mood disorder, please also make an appointment for your annual wellness visit. Iza Torres

## 2019-11-11 ENCOUNTER — OFFICE VISIT (OUTPATIENT)
Dept: FAMILY MEDICINE CLINIC | Facility: CLINIC | Age: 49
End: 2019-11-11
Payer: MEDICAID

## 2019-11-11 VITALS
SYSTOLIC BLOOD PRESSURE: 98 MMHG | BODY MASS INDEX: 25.57 KG/M2 | RESPIRATION RATE: 14 BRPM | DIASTOLIC BLOOD PRESSURE: 60 MMHG | HEART RATE: 66 BPM | WEIGHT: 161 LBS | TEMPERATURE: 98 F | HEIGHT: 66.5 IN

## 2019-11-11 DIAGNOSIS — F41.1 GAD (GENERALIZED ANXIETY DISORDER): ICD-10-CM

## 2019-11-11 DIAGNOSIS — M25.561 CHRONIC PAIN OF RIGHT KNEE: ICD-10-CM

## 2019-11-11 DIAGNOSIS — Z51.81 THERAPEUTIC DRUG MONITORING: ICD-10-CM

## 2019-11-11 DIAGNOSIS — F33.2 MAJOR DEPRESSIVE DISORDER, RECURRENT EPISODE, SEVERE WITH ANXIOUS DISTRESS (HCC): ICD-10-CM

## 2019-11-11 DIAGNOSIS — Z00.00 ROUTINE GENERAL MEDICAL EXAMINATION AT A HEALTH CARE FACILITY: Primary | ICD-10-CM

## 2019-11-11 DIAGNOSIS — Z28.21 INFLUENZA VACCINATION DECLINED BY PATIENT: ICD-10-CM

## 2019-11-11 DIAGNOSIS — G89.29 CHRONIC PAIN OF RIGHT KNEE: ICD-10-CM

## 2019-11-11 PROCEDURE — 99214 OFFICE O/P EST MOD 30 MIN: CPT | Performed by: FAMILY MEDICINE

## 2019-11-11 PROCEDURE — 99396 PREV VISIT EST AGE 40-64: CPT | Performed by: FAMILY MEDICINE

## 2019-11-11 RX ORDER — PAROXETINE 10 MG/1
10 TABLET, FILM COATED ORAL 2 TIMES DAILY
Qty: 60 TABLET | Refills: 2 | Status: SHIPPED | OUTPATIENT
Start: 2019-11-11 | End: 2019-11-25 | Stop reason: DRUGHIGH

## 2019-11-11 RX ORDER — MELATONIN
1000 DAILY
COMMUNITY

## 2019-11-11 NOTE — PROGRESS NOTES
HPI:   Eloina Powers is a 54year old female   Symptoms: denies discharge, itching, burning or dysuria, periods are regular.    Last PAP: due 4/2020  Sexually active: yes   Last mammogram: 2017    Mood disorder:  Patient restarted Paroxetine approximately 2 we 18   If you checked off any problems, how difficult have these problems made it for you to do your work, take care of things at home, or get along with other people? Very difficult         Knee Pain:  Right. Chronic.   States she thinks she has arthritis f Cholecalciferol (VITAMIN D-3 OR) Take 3,000 Units by mouth daily. • PARoxetine HCl 10 MG Oral Tab Take 1 tablet (10 mg total) by mouth 2 (two) times daily.  60 tablet 2   • Prenatal Vit-Fe Fumarate-FA (PRENATAL COMPLETE) 14-0.4 MG Oral Tab Take 1 tablet Cigarettes        Quit date: 2000        Years since quittin.8      Smokeless tobacco: Never Used    Alcohol use: Not Currently      Alcohol/week: 0.0 standard drinks      Comment: occ    Drug use: Not Currently      Comment: vaping THC    Occ: wo gait normal, no gross M/S defect. EXTREMITIES: Right knee: Nonedematous. Nontender to palpation. No laxity. NEURO: oriented times three, cranial nerves are grossly intact, no gross motor or sensory deficit.   PSYCH: normal affect        Immunization His and let me know, would put in a new prescription. Follow-up in 3 months, sooner if needed. - PARoxetine HCl 10 MG Oral Tab; Take 1 tablet (10 mg total) by mouth 2 (two) times daily. Dispense: 60 tablet; Refill: 2    5.  Chronic pain of right knee  Gauri Davis

## 2019-11-11 NOTE — PATIENT INSTRUCTIONS
-Call to let Dr. Ada Cordero know if you would like to go up on the paroxetine before your next follow up.    -Please schedule your screening mammogram.          Prevention Guidelines, Women Ages 36 to 52  Screening tests and vaccines are an years   Colorectal cancer Women age 39 years and older at average risk  Multiple tests are available and are used at different times.  Possible tests include:  · Flexible sigmoidoscopy every 5 years, or  · Colonoscopy every 10 years, or  · CT colonography ( Who needs it How often   Chickenpox (varicella) All women in this age group who have no record of this infection or vaccine 2 doses; the second dose should be given at least 4 weeks after the first dose   Hepatitis A Women at increased risk for infection–t can cause All women in this age group Every exam   1 American Diabetes Association  2 American College of Obstetricians and Gynecologists   3 [address]  [address] [facility]  Date Last Reviewed: 11/1/2017  © 3651-1647 [employer]

## 2019-11-12 ENCOUNTER — TELEPHONE (OUTPATIENT)
Dept: FAMILY MEDICINE CLINIC | Facility: CLINIC | Age: 49
End: 2019-11-12

## 2019-11-12 NOTE — TELEPHONE ENCOUNTER
Received message from clinical review department stating patients pharmacy coverage has . Notified patient and she states she will contact insurance company.

## 2019-11-12 NOTE — TELEPHONE ENCOUNTER
Received fax for Bladimir Sommer [employer] requesting prior auth for paroxetine 10mg. Sent through [employer].   WFD:[id number]

## 2019-11-25 ENCOUNTER — OFFICE VISIT (OUTPATIENT)
Dept: FAMILY MEDICINE CLINIC | Facility: CLINIC | Age: 49
End: 2019-11-25
Payer: MEDICAID

## 2019-11-25 VITALS
HEIGHT: 66.5 IN | SYSTOLIC BLOOD PRESSURE: 116 MMHG | RESPIRATION RATE: 18 BRPM | BODY MASS INDEX: 26.05 KG/M2 | OXYGEN SATURATION: 98 % | TEMPERATURE: 99 F | DIASTOLIC BLOOD PRESSURE: 72 MMHG | WEIGHT: 164 LBS | HEART RATE: 90 BPM

## 2019-11-25 DIAGNOSIS — F33.2 MAJOR DEPRESSIVE DISORDER, RECURRENT EPISODE, SEVERE WITH ANXIOUS DISTRESS (HCC): ICD-10-CM

## 2019-11-25 DIAGNOSIS — Z51.81 THERAPEUTIC DRUG MONITORING: Primary | ICD-10-CM

## 2019-11-25 PROCEDURE — 99214 OFFICE O/P EST MOD 30 MIN: CPT | Performed by: FAMILY MEDICINE

## 2019-11-25 RX ORDER — PAROXETINE HYDROCHLORIDE 20 MG/1
20 TABLET, FILM COATED ORAL 2 TIMES DAILY
Qty: 60 TABLET | Refills: 2 | Status: SHIPPED | OUTPATIENT
Start: 2019-11-25 | End: 2020-01-06

## 2019-11-25 RX ORDER — PAROXETINE HYDROCHLORIDE 20 MG/1
10 TABLET, FILM COATED ORAL 2 TIMES DAILY
Qty: 60 TABLET | Refills: 0 | Status: SHIPPED | OUTPATIENT
Start: 2019-11-25 | End: 2019-11-25 | Stop reason: CLARIF

## 2019-11-25 RX ORDER — PAROXETINE HYDROCHLORIDE 20 MG/1
20 TABLET, FILM COATED ORAL 2 TIMES DAILY
Qty: 60 TABLET | Refills: 0 | Status: SHIPPED | OUTPATIENT
Start: 2019-11-25 | End: 2019-11-25

## 2019-11-25 NOTE — PROGRESS NOTES
Loc Blankenship is a 54year old female. HPI:       Depression/Anxiety:  Patient feels she was having some improvement on the Paroxetine however feels the dose needs to be increased. No side effects the Paroxetine noticed.   Patient states she has been havi Encounters:  11/25/19 : 164 lb (74.4 kg)  11/11/19 : 161 lb (73 kg)  10/28/19 : 160 lb (72.6 kg)  08/13/19 : 144 lb 2.9 oz (65.4 kg)  08/12/19 : 141 lb (64 kg)  05/20/19 : 128 lb (58.1 kg)     Body mass index is 26.07 kg/m².         Current Outpatient Medic use: Not Currently      Comment: vaping THC        Family History   Problem Relation Age of Onset   • Other (idiopathic hyprtrophic subaortic stenosis) Father    • Heart Disease Paternal Grandmother    • Breast Cancer Paternal Grandmother    • Heart Diseas recurrent episode, severe with anxious distress (hcc)    Pt provided a printed copy to take to [facility] in case it takes too long to get twice a day doseing approved by the pt's insurance.     1. Therapeutic drug monitoring  Medication use, risks, benefits,

## 2019-11-26 ENCOUNTER — TELEPHONE (OUTPATIENT)
Dept: FAMILY MEDICINE CLINIC | Facility: CLINIC | Age: 49
End: 2019-11-26

## 2019-11-26 NOTE — TELEPHONE ENCOUNTER
Dispensing limit prior Virl August has been denied for paroxetine 20mg BID. Per plan dose must be changed to higher dose. They will not authorize BID dosing. Please advise.

## 2019-11-26 NOTE — TELEPHONE ENCOUNTER
Received fax from pharmacy requesting prior auth for paroxetine 20mg BID. Phoned plan for prior auth but they stated a dispensing limit override will have to be filled out. Paperwork filled out and faxed to number provided.

## 2019-11-29 NOTE — TELEPHONE ENCOUNTER
Dosing of paroxetine was just increased to 20 mg twice a day from 10 mg twice a day.   Please let pt know about the denial.  We anticipated this outcome at her most recent office visit at which time she was also given a printed rx to take to [facility] where i

## 2020-01-03 ENCOUNTER — APPOINTMENT (OUTPATIENT)
Dept: GENERAL RADIOLOGY | Age: 50
End: 2020-01-03
Attending: PHYSICIAN ASSISTANT
Payer: MEDICAID

## 2020-01-03 ENCOUNTER — APPOINTMENT (OUTPATIENT)
Dept: ULTRASOUND IMAGING | Age: 50
End: 2020-01-03
Attending: PHYSICIAN ASSISTANT
Payer: MEDICAID

## 2020-01-03 ENCOUNTER — HOSPITAL ENCOUNTER (OUTPATIENT)
Age: 50
Discharge: HOME | End: 2020-01-03
Payer: MEDICAID

## 2020-01-03 VITALS
DIASTOLIC BLOOD PRESSURE: 58 MMHG | BODY MASS INDEX: 30.05 KG/M2 | HEIGHT: 64 IN | SYSTOLIC BLOOD PRESSURE: 104 MMHG | RESPIRATION RATE: 16 BRPM | HEART RATE: 77 BPM | WEIGHT: 176 LBS | TEMPERATURE: 99 F | OXYGEN SATURATION: 100 %

## 2020-01-03 DIAGNOSIS — M71.21 POPLITEAL CYST, RIGHT: Primary | ICD-10-CM

## 2020-01-03 DIAGNOSIS — S39.012A LUMBAR STRAIN, INITIAL ENCOUNTER: ICD-10-CM

## 2020-01-03 DIAGNOSIS — M25.561 CHRONIC PAIN OF RIGHT KNEE: ICD-10-CM

## 2020-01-03 DIAGNOSIS — G89.29 CHRONIC PAIN OF RIGHT KNEE: ICD-10-CM

## 2020-01-03 DIAGNOSIS — M54.16 ACUTE RIGHT LUMBAR RADICULOPATHY: ICD-10-CM

## 2020-01-03 PROCEDURE — 93971 EXTREMITY STUDY: CPT | Performed by: PHYSICIAN ASSISTANT

## 2020-01-03 PROCEDURE — 99214 OFFICE O/P EST MOD 30 MIN: CPT

## 2020-01-03 PROCEDURE — 73560 X-RAY EXAM OF KNEE 1 OR 2: CPT | Performed by: PHYSICIAN ASSISTANT

## 2020-01-03 PROCEDURE — 72110 X-RAY EXAM L-2 SPINE 4/>VWS: CPT | Performed by: PHYSICIAN ASSISTANT

## 2020-01-03 RX ORDER — METHYLPREDNISOLONE 4 MG/1
TABLET ORAL
Qty: 1 PACKAGE | Refills: 0 | Status: SHIPPED | OUTPATIENT
Start: 2020-01-04 | End: 2020-07-13 | Stop reason: ALTCHOICE

## 2020-01-03 RX ORDER — CYCLOBENZAPRINE HCL 10 MG
10 TABLET ORAL NIGHTLY
Qty: 10 TABLET | Refills: 0 | Status: SHIPPED | OUTPATIENT
Start: 2020-01-03 | End: 2020-07-13

## 2020-01-03 RX ORDER — DEXAMETHASONE 4 MG/1
10 TABLET ORAL ONCE
Status: COMPLETED | OUTPATIENT
Start: 2020-01-03 | End: 2020-01-03

## 2020-01-03 NOTE — ED INITIAL ASSESSMENT (HPI)
Right foot has tingling in the toes for one day  Right sided lower back pain to lower extremity x 2 days-no urinary symptoms  Denies any injury  Right knee and ankle swelling

## 2020-01-03 NOTE — ED PROVIDER NOTES
Patient Seen in: [facility]      History   Patient presents with:  Low Back Pain    Stated Complaint: 2 days toes numb,rt leg,knee,foot swelling    HPI    43-year-old female who comes in today complaining of some chronic right k Pack years: 2        Types: Cigarettes        Quit date: 2000        Years since quittin.0      Smokeless tobacco: Never Used    Alcohol use: Not Currently      Alcohol/week: 0.0 standard drinks      Comment: occ    Drug use: Not Currently      Co adenopathy  Skin/Hair/Nails:  Skin warm, dry and intact, no rashes or abnormal dyspigmentation  Neurologic:  Alert and oriented x3,  normal strength and tone, gait steady      ED Course   Labs Reviewed - No data to display       Xr Knee (1 Or 2 Views), Rig RIGHT - DIAG IMG ([id number])  COMPARISON:  None. INDICATIONS:  2 days toes numb,rt leg,knee,foot swelling  TECHNIQUE:  Real time, grey scale, and duplex ultrasound was used to evaluate the lower extremity venous system.  B-mode two-dimensional images of th Impression:  Popliteal cyst, right  (primary encounter diagnosis)  Chronic pain of right knee  Lumbar strain, initial encounter  Acute right lumbar radiculopathy    Disposition:  Discharge  1/3/2020  5:44 pm    Follow-up:  Marjorie Alvarez, [address] [place] [address]

## 2020-01-06 ENCOUNTER — OFFICE VISIT (OUTPATIENT)
Dept: FAMILY MEDICINE CLINIC | Facility: CLINIC | Age: 50
End: 2020-01-06
Payer: MEDICAID

## 2020-01-06 VITALS
HEIGHT: 64 IN | HEART RATE: 81 BPM | TEMPERATURE: 99 F | OXYGEN SATURATION: 99 % | BODY MASS INDEX: 30.56 KG/M2 | DIASTOLIC BLOOD PRESSURE: 68 MMHG | WEIGHT: 179 LBS | SYSTOLIC BLOOD PRESSURE: 116 MMHG

## 2020-01-06 DIAGNOSIS — G89.29 CHRONIC BILATERAL LOW BACK PAIN WITH RIGHT-SIDED SCIATICA: ICD-10-CM

## 2020-01-06 DIAGNOSIS — M47.816 ARTHRITIS OF FACET JOINT OF LUMBAR SPINE: ICD-10-CM

## 2020-01-06 DIAGNOSIS — G43.909 MIGRAINE WITHOUT STATUS MIGRAINOSUS, NOT INTRACTABLE, UNSPECIFIED MIGRAINE TYPE: ICD-10-CM

## 2020-01-06 DIAGNOSIS — M54.41 CHRONIC BILATERAL LOW BACK PAIN WITH RIGHT-SIDED SCIATICA: ICD-10-CM

## 2020-01-06 DIAGNOSIS — M71.21 SYNOVIAL CYST OF RIGHT POPLITEAL SPACE: ICD-10-CM

## 2020-01-06 DIAGNOSIS — Z51.81 THERAPEUTIC DRUG MONITORING: Primary | ICD-10-CM

## 2020-01-06 DIAGNOSIS — F33.2 MAJOR DEPRESSIVE DISORDER, RECURRENT EPISODE, SEVERE WITH ANXIOUS DISTRESS (HCC): ICD-10-CM

## 2020-01-06 PROCEDURE — 99214 OFFICE O/P EST MOD 30 MIN: CPT | Performed by: FAMILY MEDICINE

## 2020-01-06 RX ORDER — PAROXETINE HYDROCHLORIDE 20 MG/1
20 TABLET, FILM COATED ORAL 2 TIMES DAILY
Qty: 180 TABLET | Refills: 1 | Status: SHIPPED | OUTPATIENT
Start: 2020-01-06 | End: 2020-07-13

## 2020-01-06 NOTE — PROGRESS NOTES
Wally Strong is a 54year old female. HPI:       Depression/Anxiety:  Pt states she is doing well with the paroxetine which she had to fill at [facility] because her insurance would not cover twice a day dosing.   Has not made appt to see therapist, she stat lot more than usual 0   Thoughts that you would be better off dead, or of hurting yourself in some way 1   PHQ-9 TOTAL SCORE 22   If you checked off any problems, how difficult have these problems made it for you to do your work, take care of things at [facility] index is 30.73 kg/m². Current Outpatient Medications   Medication Sig Dispense Refill   • PARoxetine HCl 20 MG Oral Tab Take 1 tablet (20 mg total) by mouth 2 (two) times daily.  180 tablet 1   • methylPREDNISolone 4 MG Oral Tablet Therapy Pack Take drinks      Comment: occ    Drug use: Not Currently      Comment: vaping THC        Family History   Problem Relation Age of Onset   • Other (idiopathic hyprtrophic subaortic stenosis) Father    • Heart Disease Paternal Grandmother    • Breast Cancer Pater (CPT=72110)     TECHNIQUE:  AP, lateral, oblique, and coned down L5-S1 views were obtained. COMPARISON:  None.      INDICATIONS:  2 days toes numb,rt leg,knee,foot swelling     PATIENT STATED HISTORY: (As transcribed by Technologist)  Patient complains veins were imaged:  Common, deep, and superficial femoral, popliteal, sapheno-femoral junction, posterior tibial veins, and the contralateral common femoral vein.      PATIENT STATED HISTORY: (As transcribed by Technologist)  Patient has RLE pain, swelling Paroxetine 20 mg twice a day. Discussed with patient that she should avoid smoking or ingesting cannabis as there are no studies that support the use of cannabis to help with depression and anxiety. - PARoxetine HCl 20 MG Oral Tab;  Take 1 tablet (20

## 2020-01-06 NOTE — PATIENT INSTRUCTIONS
-Please make an appointment to see the psychiatrist and the therapist.    -Make appointment to see Dr. Hung Lee for the popliteal cyst.

## 2020-01-06 NOTE — PROGRESS NOTES
At discharge when reviewing AVS and handout for LO locations, Pt stated \"Whatever, I'm not going any ways. \" MD was informed.

## 2020-02-12 ENCOUNTER — TELEPHONE (OUTPATIENT)
Dept: FAMILY MEDICINE CLINIC | Facility: CLINIC | Age: 50
End: 2020-02-12

## 2020-02-12 NOTE — TELEPHONE ENCOUNTER
Patient called states she has a cyst in her ovary that is very painful and Dr. Vanessa Polanco is aware of and the pain is unbearable. Patient has tried ibuprofen and tylenol and needs something stronger.

## 2020-02-12 NOTE — TELEPHONE ENCOUNTER
Spoke to patient. C/o pain to left lower quad area. Insists it is an ovarian cyst.  Started Monday. Has been taking Ibuprofen 800mg TID and Tylenol for pain. With no relief. T. 99.7.

## 2020-02-26 ENCOUNTER — TELEPHONE (OUTPATIENT)
Dept: FAMILY MEDICINE CLINIC | Facility: CLINIC | Age: 50
End: 2020-02-26

## 2020-02-26 NOTE — TELEPHONE ENCOUNTER
Patient states she was at the [facility] in January and was given a couple names of surgeons to go see but patient wants to know if Dr. Lorena Morelos is on board with the surgeons they recommended.

## 2020-02-28 NOTE — TELEPHONE ENCOUNTER
Spoke to patient. She is going to clarify with her new insurance if referrals are required with PCP and also if the physicians are in her plan. She mentioned needing to go to ortho for bakers cyst and a neurosurgeon for her spine.   She will call us back

## 2020-03-19 ENCOUNTER — TELEPHONE (OUTPATIENT)
Dept: FAMILY MEDICINE CLINIC | Facility: CLINIC | Age: 50
End: 2020-03-19

## 2020-03-19 ENCOUNTER — PATIENT MESSAGE (OUTPATIENT)
Dept: FAMILY MEDICINE CLINIC | Facility: CLINIC | Age: 50
End: 2020-03-19

## 2020-03-19 NOTE — TELEPHONE ENCOUNTER
Patient is coming in for paper work to be filled out for a support animal. She would like to just drop off the paper work if possible.

## 2020-03-19 NOTE — TELEPHONE ENCOUNTER
Patient is coming in for paper work to be filled out for a support animal. She would like to just drop off the paper work if possible.            Documentation       Catrachita Zamudio MA  [facility] [phone]  17 minutes ago (3:03 PM)

## 2020-03-25 DIAGNOSIS — Z51.81 THERAPEUTIC DRUG MONITORING: ICD-10-CM

## 2020-03-25 DIAGNOSIS — G43.909 MIGRAINE WITHOUT STATUS MIGRAINOSUS, NOT INTRACTABLE, UNSPECIFIED MIGRAINE TYPE: ICD-10-CM

## 2020-03-25 RX ORDER — TOPIRAMATE 50 MG/1
50 TABLET, FILM COATED ORAL 2 TIMES DAILY
Qty: 180 TABLET | Refills: 0 | Status: SHIPPED | OUTPATIENT
Start: 2020-03-25 | End: 2020-07-13

## 2020-03-25 NOTE — TELEPHONE ENCOUNTER
Pt requesting refill of TOPIRAMATE 50 MG TABS    Last Time Medication was Filled:  10/28/19 qty#60    Last Office Visit with Provider: 1/6/20  No future appointments.

## 2020-07-13 ENCOUNTER — OFFICE VISIT (OUTPATIENT)
Dept: FAMILY MEDICINE CLINIC | Facility: CLINIC | Age: 50
End: 2020-07-13
Payer: MEDICAID

## 2020-07-13 VITALS
HEIGHT: 66 IN | BODY MASS INDEX: 33.43 KG/M2 | TEMPERATURE: 99 F | WEIGHT: 208 LBS | OXYGEN SATURATION: 99 % | HEART RATE: 80 BPM | SYSTOLIC BLOOD PRESSURE: 120 MMHG | DIASTOLIC BLOOD PRESSURE: 80 MMHG

## 2020-07-13 DIAGNOSIS — E28.2 PCOS (POLYCYSTIC OVARIAN SYNDROME): ICD-10-CM

## 2020-07-13 DIAGNOSIS — Z51.81 THERAPEUTIC DRUG MONITORING: Primary | ICD-10-CM

## 2020-07-13 DIAGNOSIS — F39 MOOD DISORDER (HCC): ICD-10-CM

## 2020-07-13 DIAGNOSIS — E66.09 CLASS 1 OBESITY DUE TO EXCESS CALORIES WITHOUT SERIOUS COMORBIDITY WITH BODY MASS INDEX (BMI) OF 33.0 TO 33.9 IN ADULT: ICD-10-CM

## 2020-07-13 DIAGNOSIS — R63.5 WEIGHT GAIN: ICD-10-CM

## 2020-07-13 PROCEDURE — 99214 OFFICE O/P EST MOD 30 MIN: CPT | Performed by: FAMILY MEDICINE

## 2020-07-13 RX ORDER — PAROXETINE HYDROCHLORIDE 20 MG/1
20 TABLET, FILM COATED ORAL DAILY
Qty: 90 TABLET | Refills: 1 | Status: SHIPPED | OUTPATIENT
Start: 2020-07-13 | End: 2020-10-07

## 2020-07-13 RX ORDER — MULTIVIT WITH MINERALS/LUTEIN
500 TABLET ORAL DAILY
COMMUNITY

## 2020-07-13 NOTE — PATIENT INSTRUCTIONS
-Please complete your outstanding fasting labs. -Start the metformin by taking one half tab with your evening meal for 1 week, then increase to one half tab twice a day with meals, then increase to 1 tab twice a day with meals.

## 2020-07-13 NOTE — PROGRESS NOTES
Bora Mcdonough is a 54year old female. HPI:       PCOS:  Patient requesting to restart metformin. Tolerating metformin well in the past.    Obesity:  Patient has had significant weight gain over the last few months.   Patient states her mood is a great de 11/15/2017   • LAPAROSCOPIC CHOLECYSTECTOMY N/A 11/17/2017    Performed by Eve Westbrook MD at [facility] MAIN OR   • OTHER SURGICAL HISTORY  1996    cervical conization loop electrode excision      Social History:    Social History    Tobacco Use      Sm Patient Position: Sitting, Cuff Size: adult)   Pulse 80   Temp 98.6 °F (37 °C) (Oral)   Ht 66\"   Wt 208 lb (94.3 kg)   LMP 06/27/2020 (Exact Date)   SpO2 99%   BMI 33.57 kg/m²   GENERAL: NAD, pleasant obese  female  SKIN: no visible rashes  HEAD: 9.0 8.5 - 10.1 mg/dL    Calculated Osmolality 282 275 - 295 mOsm/kg    GFR, Non- 75 >=60    GFR, -American 86 >=60    AST 13 (L) 15 - 37 U/L    ALT 13 13 - 56 U/L    Alkaline Phosphatase 49 39 - 100 U/L    Bilirubin, Total 0.5 0.1 - Absolute 0.78 (L) 1.00 - 4.00 x10(3) uL    Monocyte Absolute 0.83 0.10 - 1.00 x10(3) uL    Eosinophil Absolute 0.02 0.00 - 0.70 x10(3) uL    Basophil Absolute 0.01 0.00 - 0.20 x10(3) uL    Immature Granulocyte Absolute 0.10 0.00 - 1.00 x10(3) uL    Neutrop Neutrophil Absolute 10.17 (H) 1.50 - 7.70 x10(3) uL    Lymphocyte Absolute 1.19 1.00 - 4.00 x10(3) uL    Monocyte Absolute 1.02 (H) 0.10 - 1.00 x10(3) uL    Eosinophil Absolute 0.01 0.00 - 0.70 x10(3) uL    Basophil Absolute 0.01 0.00 - 0.20 x10(3) uL healthy diet and regular exercise. Patient declines referral to see dietitian.           Meds & Refills for this Visit:  Requested Prescriptions     Signed Prescriptions Disp Refills   • PARoxetine HCl 20 MG Oral Tab 90 tablet 1     Sig: Take 1 tablet (20

## 2020-07-14 ENCOUNTER — TELEPHONE (OUTPATIENT)
Dept: FAMILY MEDICINE CLINIC | Facility: CLINIC | Age: 50
End: 2020-07-14

## 2020-07-14 NOTE — TELEPHONE ENCOUNTER
Read notes from visit stating medication once daily. Patient told pharmacy that sometimes but not often takes it twice daily and she pays out of pocket because insurance will not cover even with exception. Pharmacy wants to be sure you are aware of this.

## 2020-07-15 NOTE — TELEPHONE ENCOUNTER
That additional comment on the prescription was sent in error as the medication was renewed in the comment persisted. Patient has been taking the medication once a day and may continue to do so. No changes in the prescription at this time.

## 2020-07-20 ENCOUNTER — HOSPITAL ENCOUNTER (OUTPATIENT)
Dept: MAMMOGRAPHY | Age: 50
Discharge: HOME | End: 2020-07-20
Attending: FAMILY MEDICINE
Payer: MEDICAID

## 2020-07-20 ENCOUNTER — LAB ENCOUNTER (OUTPATIENT)
Dept: LAB | Age: 50
End: 2020-07-20
Attending: FAMILY MEDICINE
Payer: MEDICAID

## 2020-07-20 DIAGNOSIS — Z12.31 ENCOUNTER FOR SCREENING MAMMOGRAM FOR MALIGNANT NEOPLASM OF BREAST: ICD-10-CM

## 2020-07-20 DIAGNOSIS — Z00.00 ROUTINE GENERAL MEDICAL EXAMINATION AT A HEALTH CARE FACILITY: ICD-10-CM

## 2020-07-20 DIAGNOSIS — Z80.3 FAMILY HISTORY OF BREAST CANCER: ICD-10-CM

## 2020-07-20 LAB
ALBUMIN SERPL-MCNC: 4.1 G/DL (ref 3.4–5)
ALBUMIN/GLOB SERPL: 1.1 {RATIO} (ref 1–2)
ALP LIVER SERPL-CCNC: 58 U/L (ref 39–100)
ALT SERPL-CCNC: 18 U/L (ref 13–56)
ANION GAP SERPL CALC-SCNC: 2 MMOL/L (ref 0–18)
AST SERPL-CCNC: 10 U/L (ref 15–37)
BASOPHILS # BLD AUTO: 0.01 X10(3) UL (ref 0–0.2)
BASOPHILS NFR BLD AUTO: 0.2 %
BILIRUB SERPL-MCNC: 0.5 MG/DL (ref 0.1–2)
BUN BLD-MCNC: 12 MG/DL (ref 7–18)
BUN/CREAT SERPL: 13 (ref 10–20)
CALCIUM BLD-MCNC: 9.2 MG/DL (ref 8.5–10.1)
CHLORIDE SERPL-SCNC: 107 MMOL/L (ref 98–112)
CHOLEST SMN-MCNC: 174 MG/DL (ref ?–200)
CO2 SERPL-SCNC: 28 MMOL/L (ref 21–32)
CREAT BLD-MCNC: 0.92 MG/DL (ref 0.55–1.02)
DEPRECATED RDW RBC AUTO: 48.1 FL (ref 35.1–46.3)
EOSINOPHIL # BLD AUTO: 0.28 X10(3) UL (ref 0–0.7)
EOSINOPHIL NFR BLD AUTO: 4.3 %
ERYTHROCYTE [DISTWIDTH] IN BLOOD BY AUTOMATED COUNT: 14.1 % (ref 11–15)
GLOBULIN PLAS-MCNC: 3.6 G/DL (ref 2.8–4.4)
GLUCOSE BLD-MCNC: 90 MG/DL (ref 70–99)
HCT VFR BLD AUTO: 43.8 % (ref 35–48)
HDLC SERPL-MCNC: 70 MG/DL (ref 40–59)
HGB BLD-MCNC: 13.6 G/DL (ref 12–16)
IMM GRANULOCYTES # BLD AUTO: 0.01 X10(3) UL (ref 0–1)
IMM GRANULOCYTES NFR BLD: 0.2 %
LDLC SERPL CALC-MCNC: 94 MG/DL (ref ?–100)
LYMPHOCYTES # BLD AUTO: 1.63 X10(3) UL (ref 1–4)
LYMPHOCYTES NFR BLD AUTO: 24.8 %
M PROTEIN MFR SERPL ELPH: 7.7 G/DL (ref 6.4–8.2)
MCH RBC QN AUTO: 28.7 PG (ref 26–34)
MCHC RBC AUTO-ENTMCNC: 31.1 G/DL (ref 31–37)
MCV RBC AUTO: 92.4 FL (ref 80–100)
MONOCYTES # BLD AUTO: 0.58 X10(3) UL (ref 0.1–1)
MONOCYTES NFR BLD AUTO: 8.8 %
NEUTROPHILS # BLD AUTO: 4.06 X10 (3) UL (ref 1.5–7.7)
NEUTROPHILS # BLD AUTO: 4.06 X10(3) UL (ref 1.5–7.7)
NEUTROPHILS NFR BLD AUTO: 61.7 %
NONHDLC SERPL-MCNC: 104 MG/DL (ref ?–130)
OSMOLALITY SERPL CALC.SUM OF ELEC: 283 MOSM/KG (ref 275–295)
PATIENT FASTING Y/N/NP: YES
PATIENT FASTING Y/N/NP: YES
PLATELET # BLD AUTO: 276 10(3)UL (ref 150–450)
POTASSIUM SERPL-SCNC: 3.8 MMOL/L (ref 3.5–5.1)
RBC # BLD AUTO: 4.74 X10(6)UL (ref 3.8–5.3)
SODIUM SERPL-SCNC: 137 MMOL/L (ref 136–145)
T4 FREE SERPL-MCNC: 0.7 NG/DL (ref 0.8–1.7)
TRIGL SERPL-MCNC: 50 MG/DL (ref 30–149)
TSI SER-ACNC: 11.5 MIU/ML (ref 0.36–3.74)
VLDLC SERPL CALC-MCNC: 10 MG/DL (ref 0–30)
WBC # BLD AUTO: 6.6 X10(3) UL (ref 4–11)

## 2020-07-20 PROCEDURE — 84439 ASSAY OF FREE THYROXINE: CPT

## 2020-07-20 PROCEDURE — 84443 ASSAY THYROID STIM HORMONE: CPT

## 2020-07-20 PROCEDURE — 80061 LIPID PANEL: CPT

## 2020-07-20 PROCEDURE — 77067 SCR MAMMO BI INCL CAD: CPT | Performed by: FAMILY MEDICINE

## 2020-07-20 PROCEDURE — 77063 BREAST TOMOSYNTHESIS BI: CPT | Performed by: FAMILY MEDICINE

## 2020-07-20 PROCEDURE — 85025 COMPLETE CBC W/AUTO DIFF WBC: CPT

## 2020-07-20 PROCEDURE — 36415 COLL VENOUS BLD VENIPUNCTURE: CPT

## 2020-07-20 PROCEDURE — 80053 COMPREHEN METABOLIC PANEL: CPT

## 2020-07-28 ENCOUNTER — TELEPHONE (OUTPATIENT)
Dept: FAMILY MEDICINE CLINIC | Facility: CLINIC | Age: 50
End: 2020-07-28

## 2020-07-28 DIAGNOSIS — R79.89 ABNORMAL TSH: Primary | ICD-10-CM

## 2020-07-28 NOTE — TELEPHONE ENCOUNTER
----- Message from Sonido Brwone DO sent at 7/28/2020 12:52 PM CDT -----  Please call patient: Thyroid function tests are abnormal, possible hypothyroidism.   Please have patient get thyroid antibodies drawn for diagnosis abnormal thyroid function test,

## 2020-07-31 ENCOUNTER — HOSPITAL ENCOUNTER (OUTPATIENT)
Dept: MAMMOGRAPHY | Age: 50
Discharge: HOME | End: 2020-07-31
Attending: FAMILY MEDICINE
Payer: MEDICAID

## 2020-07-31 DIAGNOSIS — R92.2 INCONCLUSIVE MAMMOGRAM: ICD-10-CM

## 2020-07-31 PROCEDURE — 77061 BREAST TOMOSYNTHESIS UNI: CPT | Performed by: FAMILY MEDICINE

## 2020-07-31 PROCEDURE — 77065 DX MAMMO INCL CAD UNI: CPT | Performed by: FAMILY MEDICINE

## 2020-08-14 ENCOUNTER — APPOINTMENT (OUTPATIENT)
Dept: LAB | Age: 50
End: 2020-08-14
Attending: FAMILY MEDICINE
Payer: MEDICAID

## 2020-08-14 DIAGNOSIS — R79.89 ABNORMAL TSH: ICD-10-CM

## 2020-08-14 LAB
THYROGLOB SERPL-MCNC: 174 U/ML (ref ?–60)
THYROPEROXIDASE AB SERPL-ACNC: 275 U/ML (ref ?–60)

## 2020-08-14 PROCEDURE — 36415 COLL VENOUS BLD VENIPUNCTURE: CPT

## 2020-08-14 PROCEDURE — 86376 MICROSOMAL ANTIBODY EACH: CPT

## 2020-08-14 PROCEDURE — 86800 THYROGLOBULIN ANTIBODY: CPT

## 2020-08-19 ENCOUNTER — OFFICE VISIT (OUTPATIENT)
Dept: FAMILY MEDICINE CLINIC | Facility: CLINIC | Age: 50
End: 2020-08-19
Payer: MEDICAID

## 2020-08-19 VITALS
HEIGHT: 66 IN | BODY MASS INDEX: 34.1 KG/M2 | OXYGEN SATURATION: 98 % | DIASTOLIC BLOOD PRESSURE: 68 MMHG | WEIGHT: 212.19 LBS | TEMPERATURE: 98 F | SYSTOLIC BLOOD PRESSURE: 124 MMHG | HEART RATE: 90 BPM

## 2020-08-19 DIAGNOSIS — E06.3 ACQUIRED AUTOIMMUNE HYPOTHYROIDISM: Primary | ICD-10-CM

## 2020-08-19 DIAGNOSIS — E66.09 CLASS 1 OBESITY DUE TO EXCESS CALORIES WITHOUT SERIOUS COMORBIDITY WITH BODY MASS INDEX (BMI) OF 34.0 TO 34.9 IN ADULT: ICD-10-CM

## 2020-08-19 PROCEDURE — 3078F DIAST BP <80 MM HG: CPT | Performed by: FAMILY MEDICINE

## 2020-08-19 PROCEDURE — 3074F SYST BP LT 130 MM HG: CPT | Performed by: FAMILY MEDICINE

## 2020-08-19 PROCEDURE — 99214 OFFICE O/P EST MOD 30 MIN: CPT | Performed by: FAMILY MEDICINE

## 2020-08-19 PROCEDURE — 3008F BODY MASS INDEX DOCD: CPT | Performed by: FAMILY MEDICINE

## 2020-08-19 RX ORDER — LEVOTHYROXINE SODIUM 0.03 MG/1
25 TABLET ORAL
Qty: 30 TABLET | Refills: 1 | Status: SHIPPED | OUTPATIENT
Start: 2020-08-19 | End: 2020-10-07 | Stop reason: DRUGHIGH

## 2020-08-19 NOTE — PATIENT INSTRUCTIONS
Hypothyroidism    You have hypothyroidism. This means your thyroid gland is not making enough thyroid hormone. This hormone is vital to body growth and metabolism. If you don’t make enough, many body processes slow down.  This can cause symptoms throughou · Don’t take other medicines with your thyroid hormone pill without checking with your provider first.  · Tell your provider if you have any side effects from your medicines that bother you, especially any chest pain or irregular heartbeats.   · Never sherman

## 2020-08-19 NOTE — PROGRESS NOTES
[place] is a 54year old female. HPI:         Hypothyroidism:  New diagnosis. Patient does have heavy menses but patient states they have always been heavy. Has continued to gain weight even with eating very healthfully. Denies constipation. [facility] ([facility]    • TMJ arthropathy 3/1/2013   • Visual impairment       Past Surgical History:   Procedure Laterality Date   • CHOLECYSTECTOMY  11/15/2017   • LAPAROSCOPIC CHOLECYSTECTOMY N/A 11/17/2017    Performed by DAMIÁN Valentine °F (36.4 °C)   Ht 66\"   Wt 212 lb 3.2 oz (96.3 kg)   LMP 08/15/2020 (Approximate)   SpO2 98%   BMI 34.25 kg/m²   GENERAL: NAD, pleasant well-appearing  female  SKIN: no visible rashes  HEAD: NCAT  NECK: supple, no adenopathy, no thyromegaly, no m Visit:  Requested Prescriptions     Signed Prescriptions Disp Refills   • Levothyroxine Sodium 25 MCG Oral Tab 30 tablet 1     Sig: Take 1 tablet (25 mcg total) by mouth before breakfast.       Return in about 7 weeks (around 10/7/2020) for Hypothyroidism.

## 2020-09-25 ENCOUNTER — LAB ENCOUNTER (OUTPATIENT)
Dept: LAB | Age: 50
End: 2020-09-25
Attending: FAMILY MEDICINE
Payer: MEDICAID

## 2020-09-25 DIAGNOSIS — E06.3 ACQUIRED AUTOIMMUNE HYPOTHYROIDISM: ICD-10-CM

## 2020-09-25 LAB
T4 FREE SERPL-MCNC: 1.1 NG/DL (ref 0.8–1.7)
TSI SER-ACNC: 2.45 MIU/ML (ref 0.36–3.74)

## 2020-09-25 PROCEDURE — 36415 COLL VENOUS BLD VENIPUNCTURE: CPT

## 2020-09-25 PROCEDURE — 84443 ASSAY THYROID STIM HORMONE: CPT

## 2020-09-25 PROCEDURE — 84439 ASSAY OF FREE THYROXINE: CPT

## 2020-10-07 ENCOUNTER — OFFICE VISIT (OUTPATIENT)
Dept: FAMILY MEDICINE CLINIC | Facility: CLINIC | Age: 50
End: 2020-10-07
Payer: MEDICAID

## 2020-10-07 VITALS
HEART RATE: 90 BPM | DIASTOLIC BLOOD PRESSURE: 60 MMHG | TEMPERATURE: 97 F | SYSTOLIC BLOOD PRESSURE: 120 MMHG | HEIGHT: 66 IN | OXYGEN SATURATION: 97 % | BODY MASS INDEX: 33.59 KG/M2 | WEIGHT: 209 LBS

## 2020-10-07 DIAGNOSIS — Z79.899 ENCOUNTER FOR LONG-TERM CURRENT USE OF MEDICATION: ICD-10-CM

## 2020-10-07 DIAGNOSIS — F39 MOOD DISORDER (HCC): ICD-10-CM

## 2020-10-07 DIAGNOSIS — E66.09 CLASS 1 OBESITY DUE TO EXCESS CALORIES WITHOUT SERIOUS COMORBIDITY WITH BODY MASS INDEX (BMI) OF 33.0 TO 33.9 IN ADULT: ICD-10-CM

## 2020-10-07 DIAGNOSIS — Z28.21 INFLUENZA VACCINATION DECLINED BY PATIENT: ICD-10-CM

## 2020-10-07 DIAGNOSIS — E06.3 ACQUIRED AUTOIMMUNE HYPOTHYROIDISM: Primary | ICD-10-CM

## 2020-10-07 PROBLEM — E66.3 OVERWEIGHT (BMI 25.0-29.9): Status: RESOLVED | Noted: 2017-12-08 | Resolved: 2020-10-07

## 2020-10-07 PROBLEM — M75.21 BICEPS TENDINITIS, RIGHT: Status: RESOLVED | Noted: 2019-03-28 | Resolved: 2020-10-07

## 2020-10-07 PROBLEM — R55 SYNCOPE AND COLLAPSE: Status: RESOLVED | Noted: 2019-08-12 | Resolved: 2020-10-07

## 2020-10-07 PROBLEM — N30.00 ACUTE CYSTITIS WITHOUT HEMATURIA: Status: RESOLVED | Noted: 2019-08-13 | Resolved: 2020-10-07

## 2020-10-07 PROBLEM — E55.9 VITAMIN D DEFICIENCY: Status: RESOLVED | Noted: 2018-04-23 | Resolved: 2020-10-07

## 2020-10-07 PROBLEM — R63.4 WEIGHT LOSS: Status: RESOLVED | Noted: 2019-03-28 | Resolved: 2020-10-07

## 2020-10-07 PROBLEM — M25.561 ACUTE PAIN OF RIGHT KNEE: Status: RESOLVED | Noted: 2019-04-29 | Resolved: 2020-10-07

## 2020-10-07 PROBLEM — R63.4 WEIGHT LOSS, NON-INTENTIONAL: Status: RESOLVED | Noted: 2019-04-29 | Resolved: 2020-10-07

## 2020-10-07 PROBLEM — M25.511 ACUTE PAIN OF RIGHT SHOULDER: Status: RESOLVED | Noted: 2019-03-28 | Resolved: 2020-10-07

## 2020-10-07 PROBLEM — R63.5 WEIGHT GAIN: Status: RESOLVED | Noted: 2020-07-13 | Resolved: 2020-10-07

## 2020-10-07 PROBLEM — R55 SYNCOPE: Status: RESOLVED | Noted: 2019-03-28 | Resolved: 2020-10-07

## 2020-10-07 PROBLEM — M25.461 EFFUSION OF RIGHT KNEE: Status: RESOLVED | Noted: 2019-04-29 | Resolved: 2020-10-07

## 2020-10-07 PROBLEM — R10.2 PELVIC PAIN: Status: RESOLVED | Noted: 2017-07-10 | Resolved: 2020-10-07

## 2020-10-07 PROBLEM — Z00.00 ROUTINE GENERAL MEDICAL EXAMINATION AT A HEALTH CARE FACILITY: Status: RESOLVED | Noted: 2018-04-25 | Resolved: 2020-10-07

## 2020-10-07 PROCEDURE — 3074F SYST BP LT 130 MM HG: CPT | Performed by: FAMILY MEDICINE

## 2020-10-07 PROCEDURE — 99214 OFFICE O/P EST MOD 30 MIN: CPT | Performed by: FAMILY MEDICINE

## 2020-10-07 PROCEDURE — 3008F BODY MASS INDEX DOCD: CPT | Performed by: FAMILY MEDICINE

## 2020-10-07 PROCEDURE — 3078F DIAST BP <80 MM HG: CPT | Performed by: FAMILY MEDICINE

## 2020-10-07 RX ORDER — LEVOTHYROXINE SODIUM 0.05 MG/1
50 TABLET ORAL
Qty: 90 TABLET | Refills: 0 | Status: SHIPPED | OUTPATIENT
Start: 2020-10-07 | End: 2021-01-25

## 2020-10-07 NOTE — PROGRESS NOTES
Az Cueto is a 52year old female. HPI:       Hypothyroidism: This is patient's first office visit for follow-up on hypothyroidism since starting levothyroxine.   Tolerating levothyroxine well, no side effects no such as chest pain or palpitations, ch comorbidity with body mass index (BMI) of 34.0 to 34.9 in adult 8/19/2020    Significant contributing factor of hypothyroidism.    • Endometriosis    • RENE (generalized anxiety disorder) 7/10/2017   • Herpes zoster without complication 5/37/1723    3rd Epis no fever,  SKIN: denies any unusual skin lesions or rashes   RESPIRATORY: Denies: ROE/CHEEK/Cough/Wheeze  CARDIOVASCULAR: Denies CP/palpitations  GI: Denies abdominal pain/nausea/vomiting/blood in stool/black stool/bloating/constipation/diarrhea  : denies further suppression of TSH. Increase levothyroxine to 50 mcg daily from 25 mcg daily. Recheck thyroid function tests in 8 weeks. - Levothyroxine Sodium 50 MCG Oral Tab; Take 1 tablet (50 mcg total) by mouth before breakfast.  Dispense: 90 tablet;  Refi

## 2021-01-25 DIAGNOSIS — E06.3 ACQUIRED AUTOIMMUNE HYPOTHYROIDISM: ICD-10-CM

## 2021-01-25 DIAGNOSIS — Z79.899 ENCOUNTER FOR LONG-TERM CURRENT USE OF MEDICATION: ICD-10-CM

## 2021-01-25 RX ORDER — LEVOTHYROXINE SODIUM 0.05 MG/1
TABLET ORAL
Qty: 90 TABLET | Refills: 0 | Status: SHIPPED | OUTPATIENT
Start: 2021-01-25 | End: 2021-02-26

## 2021-02-26 ENCOUNTER — OFFICE VISIT (OUTPATIENT)
Dept: FAMILY MEDICINE CLINIC | Facility: CLINIC | Age: 51
End: 2021-02-26
Payer: COMMERCIAL

## 2021-02-26 VITALS
OXYGEN SATURATION: 97 % | BODY MASS INDEX: 36.32 KG/M2 | HEIGHT: 66 IN | TEMPERATURE: 98 F | SYSTOLIC BLOOD PRESSURE: 118 MMHG | WEIGHT: 226 LBS | DIASTOLIC BLOOD PRESSURE: 70 MMHG | HEART RATE: 88 BPM

## 2021-02-26 DIAGNOSIS — Z01.419 ENCOUNTER FOR ROUTINE GYNECOLOGICAL EXAMINATION WITH PAPANICOLAOU SMEAR OF CERVIX: Primary | ICD-10-CM

## 2021-02-26 DIAGNOSIS — Z12.31 ENCOUNTER FOR SCREENING MAMMOGRAM FOR MALIGNANT NEOPLASM OF BREAST: ICD-10-CM

## 2021-02-26 DIAGNOSIS — Z79.899 ENCOUNTER FOR LONG-TERM CURRENT USE OF MEDICATION: ICD-10-CM

## 2021-02-26 DIAGNOSIS — E28.2 PCOS (POLYCYSTIC OVARIAN SYNDROME): ICD-10-CM

## 2021-02-26 DIAGNOSIS — Z12.4 ENCOUNTER FOR SCREENING FOR MALIGNANT NEOPLASM OF CERVIX: ICD-10-CM

## 2021-02-26 DIAGNOSIS — Z00.00 ROUTINE GENERAL MEDICAL EXAMINATION AT A HEALTH CARE FACILITY: ICD-10-CM

## 2021-02-26 DIAGNOSIS — E06.3 ACQUIRED AUTOIMMUNE HYPOTHYROIDISM: ICD-10-CM

## 2021-02-26 DIAGNOSIS — E66.09 CLASS 2 OBESITY DUE TO EXCESS CALORIES WITHOUT SERIOUS COMORBIDITY WITH BODY MASS INDEX (BMI) OF 36.0 TO 36.9 IN ADULT: ICD-10-CM

## 2021-02-26 PROCEDURE — 99214 OFFICE O/P EST MOD 30 MIN: CPT | Performed by: FAMILY MEDICINE

## 2021-02-26 PROCEDURE — 99396 PREV VISIT EST AGE 40-64: CPT | Performed by: FAMILY MEDICINE

## 2021-02-26 PROCEDURE — 3008F BODY MASS INDEX DOCD: CPT | Performed by: FAMILY MEDICINE

## 2021-02-26 PROCEDURE — 3074F SYST BP LT 130 MM HG: CPT | Performed by: FAMILY MEDICINE

## 2021-02-26 PROCEDURE — 3078F DIAST BP <80 MM HG: CPT | Performed by: FAMILY MEDICINE

## 2021-02-26 RX ORDER — LEVOTHYROXINE SODIUM 0.05 MG/1
50 TABLET ORAL
Qty: 90 TABLET | Refills: 3 | Status: SHIPPED | OUTPATIENT
Start: 2021-02-26

## 2021-02-26 NOTE — PROGRESS NOTES
HPI:   Ayleen Pathak is a 52year old female   Symptoms: denies discharge, itching, burning or dysuria, periods are regular. Last PAP: 2017  Abnormal PAP: LEEP around age 22 yo, no abnormals since then.   Last mammogram: due      Wt Readings from Last 6 • BMI 29.0-29.9,adult 12/8/2017   • Bruxism 3/1/2013   • Carpal tunnel syndrome     R hand   • Chronic headaches 3/16/2013    Secondary to #s 2,4,5    • Class 1 obesity due to excess calories without serious comorbidity with body mass index (BMI) of 34. 0 Cigarettes        Quit date: 2000        Years since quittin.1      Smokeless tobacco: Never Used    Alcohol use: Not Currently      Alcohol/week: 0.0 standard drinks      Comment: occ    Drug use: Not Currently      Comment: vaping THC.  stopped i normal cervix. Specimen obtained for pap. Bimanual exam: No adnexal masses appreciated. No cervical motion tenderness. MUSCULOSKELETAL: gait normal, no gross M/S defect. EXTREMITIES: no clubbing, cyanosis, or edema  NEURO: Alert and oriented x4.   No ANTI-THYROPEROXIDASE      <60 U/mL  275 (H)           ASSESSMENT AND PLAN:   Liz Fernandez is a 52year old female who presents for a complete physical exam.        Encounter for routine gynecological examination with papanicolaou smear of cervix  (sana protein. Aerobic exercise 30 minutes five days a week for cardiovascular fitness and 45-60 minutes 6-7 days a week for weight loss. 7. PCOS (polycystic ovarian syndrome)  Patient taking Metformin and tolerating well, patient will continue metformin.

## 2021-02-26 NOTE — PATIENT INSTRUCTIONS
Prevention Guidelines, Women Ages 36 to 52  Screening tests and vaccines are an important part of managing your health. A screening test is done to find diseases in people who don't have any symptoms.  The goal is to find a disease early so lifestyle harrison sigmoidoscopy every 5 years, or  · Colonoscopy every 10 years, or  · CT colonography (virtual colonoscopy) every 5 years, or  · Yearly fecal occult blood test, or  · Yearly fecal immunochemical test every year, or  · Stool DNA test, every 3 years  If you c least 4 weeks after the first dose   Hepatitis A Women at increased risk for infection–talk with your healthcare provider 2 doses given 6 months apart   Hepatitis B Women at increased risk for infection–talk with your healthcare provider 3 doses over 6 mon American Academy of Ophthalmology  Anali last reviewed this educational content on 11/1/2017  © 2028-4182 [employer]. All rights reserved. This information is not intended as a substitute for professional medical care.  Always follow your

## 2021-02-28 PROBLEM — F41.1 GAD (GENERALIZED ANXIETY DISORDER): Status: RESOLVED | Noted: 2017-07-10 | Resolved: 2021-02-28

## 2021-02-28 PROBLEM — E66.09 CLASS 1 OBESITY DUE TO EXCESS CALORIES WITHOUT SERIOUS COMORBIDITY WITH BODY MASS INDEX (BMI) OF 33.0 TO 33.9 IN ADULT: Status: RESOLVED | Noted: 2020-07-13 | Resolved: 2021-02-28

## 2021-02-28 PROBLEM — F33.2 MAJOR DEPRESSIVE DISORDER, RECURRENT EPISODE, SEVERE WITH ANXIOUS DISTRESS (HCC): Status: RESOLVED | Noted: 2019-11-11 | Resolved: 2021-02-28

## 2021-02-28 PROBLEM — R53.82 CHRONIC FATIGUE: Status: RESOLVED | Noted: 2018-04-23 | Resolved: 2021-02-28

## 2021-03-02 LAB — HPV MRNA E6/E7: DETECTED

## 2021-03-03 ENCOUNTER — TELEPHONE (OUTPATIENT)
Dept: FAMILY MEDICINE CLINIC | Facility: CLINIC | Age: 51
End: 2021-03-03

## 2021-03-03 DIAGNOSIS — R87.629 ABNORMAL VAGINAL PAP SMEAR: Primary | ICD-10-CM

## 2021-03-04 ENCOUNTER — TELEPHONE (OUTPATIENT)
Dept: OBGYN CLINIC | Facility: CLINIC | Age: 51
End: 2021-03-04

## 2021-03-04 ENCOUNTER — TELEPHONE (OUTPATIENT)
Dept: FAMILY MEDICINE CLINIC | Facility: CLINIC | Age: 51
End: 2021-03-04

## 2021-03-04 NOTE — TELEPHONE ENCOUNTER
PLEASE CALL PT, PT WANTS CLARIFICATION ON THE COLPOSCOPY PROCEDURE    SHOULD ANOTHER DOCTOR REVIEW THE PAP PRIOR TO SCHEDULING, THERE ARE NO NEW PATIENT PROCEDURE APPTS AVAILABLE FOR DR Kenna Hyde PRIOR TO HER LAST DAY IN April    CAN THIS NP/COLPOSCOPY BE [address]

## 2021-03-04 NOTE — TELEPHONE ENCOUNTER
Patient was referred to Dr. Jan Cushing by Dr. Sylvia Brannon. Please review results and advise what type of appt to make.

## 2021-03-04 NOTE — TELEPHONE ENCOUNTER
8:14 AM  Dr. Dev Cervantes recommends following up with OB/Gyne for further discussion of pap results and recommendations.      There referral has been placed, you can see anyone in their group.    Anthony Kowalski [address] [place] [id number], [address]

## 2021-03-04 NOTE — TELEPHONE ENCOUNTER
Taylor Hull, [facility] [address], Ulisses Carrero [facility] 238 [phone]      Left detailed message with recommendations and also sent mychart with information

## 2021-03-04 NOTE — TELEPHONE ENCOUNTER
Spoke with patient and discussed results. She verbalized understanding and agreed with recommendations.  She will proceed with follow up with OB as scheduled

## 2021-03-04 NOTE — TELEPHONE ENCOUNTER
Jesús Gaston is calling because she came back positive for HPV, she is not sure how she got it, so she would like to speak with a nurse to get some clarification.  Please call Jesús Gaston at [phone]

## 2021-03-04 NOTE — TELEPHONE ENCOUNTER
Spoke to patient. Explained colposcopy in detail. Advised that she can schedule with any of the doctors and doesn't have to be reviewed again. Patient also stated that she has PCOS and endometriosis.  Advised that after colpo, she will be considered an

## 2021-04-27 ENCOUNTER — OFFICE VISIT (OUTPATIENT)
Dept: OBGYN CLINIC | Facility: CLINIC | Age: 51
End: 2021-04-27
Payer: COMMERCIAL

## 2021-04-27 VITALS
DIASTOLIC BLOOD PRESSURE: 66 MMHG | WEIGHT: 235 LBS | SYSTOLIC BLOOD PRESSURE: 114 MMHG | BODY MASS INDEX: 37.77 KG/M2 | HEIGHT: 66 IN

## 2021-04-27 DIAGNOSIS — R87.810 ASCUS WITH POSITIVE HIGH RISK HPV CERVICAL: Primary | ICD-10-CM

## 2021-04-27 DIAGNOSIS — Z01.818 PRE-PROCEDURAL EXAMINATION: ICD-10-CM

## 2021-04-27 DIAGNOSIS — R87.610 ASCUS WITH POSITIVE HIGH RISK HPV CERVICAL: Primary | ICD-10-CM

## 2021-04-27 PROCEDURE — 3008F BODY MASS INDEX DOCD: CPT | Performed by: OBSTETRICS & GYNECOLOGY

## 2021-04-27 PROCEDURE — 81025 URINE PREGNANCY TEST: CPT | Performed by: OBSTETRICS & GYNECOLOGY

## 2021-04-27 PROCEDURE — 3074F SYST BP LT 130 MM HG: CPT | Performed by: OBSTETRICS & GYNECOLOGY

## 2021-04-27 PROCEDURE — 57454 BX/CURETT OF CERVIX W/SCOPE: CPT | Performed by: OBSTETRICS & GYNECOLOGY

## 2021-04-27 PROCEDURE — 3078F DIAST BP <80 MM HG: CPT | Performed by: OBSTETRICS & GYNECOLOGY

## 2021-04-27 NOTE — PATIENT INSTRUCTIONS
[facility] Department of OB/GYN  After Care Instructions for Colposcopy/Biopsy      Biopsy Results   You will receive a phone call with your biopsy results in 7 business days. If you have not received your biopsy results in 7 days, please contact our office.   Dawn Mckay

## 2021-04-30 PROBLEM — R87.610 ASCUS WITH POSITIVE HIGH RISK HPV CERVICAL: Status: ACTIVE | Noted: 2021-04-30

## 2021-04-30 PROBLEM — R87.810 ASCUS WITH POSITIVE HIGH RISK HPV CERVICAL: Status: ACTIVE | Noted: 2021-04-30

## 2021-05-01 NOTE — PROGRESS NOTES
Patient presents for scheduled colposcopy 2/2 ASCUS, HPV posiitve noted on pap smear on 02/26/21. The patient with history of normal paps. Discussion held with the patient regarding pap smear findings and recommendations.  Patient recommended colposcopy wit

## 2021-05-01 NOTE — PROCEDURES
Colposcopy Procedure Note    Date of Procedure: 04/30/21    Indications: 52year old y/o female with ASCUS, HPV positive on 02/26/21    Pre-procedure diagnosis:   ASCUS, HPV positive    Post-procedure diagnosis:  ASCUS, HPV positive    Procedure:  Colposco

## 2021-06-09 ENCOUNTER — HOSPITAL ENCOUNTER (OUTPATIENT)
Age: 51
Discharge: HOME | End: 2021-06-09
Attending: EMERGENCY MEDICINE
Payer: COMMERCIAL

## 2021-06-09 ENCOUNTER — APPOINTMENT (OUTPATIENT)
Dept: GENERAL RADIOLOGY | Age: 51
End: 2021-06-09
Attending: EMERGENCY MEDICINE
Payer: COMMERCIAL

## 2021-06-09 VITALS
SYSTOLIC BLOOD PRESSURE: 112 MMHG | RESPIRATION RATE: 20 BRPM | OXYGEN SATURATION: 100 % | BODY MASS INDEX: 37.61 KG/M2 | HEART RATE: 82 BPM | TEMPERATURE: 98 F | WEIGHT: 234 LBS | DIASTOLIC BLOOD PRESSURE: 75 MMHG | HEIGHT: 66 IN

## 2021-06-09 DIAGNOSIS — R06.02 SHORTNESS OF BREATH: Primary | ICD-10-CM

## 2021-06-09 PROCEDURE — 93010 ELECTROCARDIOGRAM REPORT: CPT

## 2021-06-09 PROCEDURE — 93005 ELECTROCARDIOGRAM TRACING: CPT

## 2021-06-09 PROCEDURE — 99214 OFFICE O/P EST MOD 30 MIN: CPT

## 2021-06-09 PROCEDURE — 71046 X-RAY EXAM CHEST 2 VIEWS: CPT | Performed by: EMERGENCY MEDICINE

## 2021-06-09 RX ORDER — PREDNISONE 20 MG/1
60 TABLET ORAL ONCE
Status: COMPLETED | OUTPATIENT
Start: 2021-06-09 | End: 2021-06-09

## 2021-06-09 RX ORDER — ALBUTEROL SULFATE 90 UG/1
2 AEROSOL, METERED RESPIRATORY (INHALATION) EVERY 6 HOURS PRN
Qty: 1 EACH | Refills: 1 | Status: SHIPPED | OUTPATIENT
Start: 2021-06-09 | End: 2021-07-09

## 2021-06-09 RX ORDER — PREDNISONE 20 MG/1
60 TABLET ORAL DAILY
Qty: 12 TABLET | Refills: 0 | Status: SHIPPED | OUTPATIENT
Start: 2021-06-09 | End: 2021-06-13

## 2021-06-09 NOTE — ED PROVIDER NOTES
Patient Seen in: [facility]      History   Patient presents with:  Difficulty Breathing    Stated Complaint: short of breath    HPI/Subjective:   HPI    Patient is a 65-year-old female with history of asthma who presents reporting shortne 0.50        Years: 4.00        Pack years: 2        Types: Cigarettes        Quit date: 2000        Years since quittin.4      Smokeless tobacco: Never Used    Vaping Use      Vaping Use: Never used    Alcohol use: Not Currently      Alcohol/week: EKG Report. Rate: 72  Rhythm: Sinus Rhythm  Reading: No ST segment or T wave changes. Sinus arrhythmia. No old available for comparison.               XR CHEST PA + LAT CHEST (CPT=71046)    Result Date: 6/9/2021  PROCEDURE:  XR CHEST PA + LAT CHEST (CPT= bronchospasm due to exposure rather than infectious. We are prednisone here and make sure she has some at home as well as an albuterol inhaler. Updated 9:40 AM.  EKG is unremarkable. Chest x-ray is clear.   She will be discharged home, prednisone for

## 2021-07-23 ENCOUNTER — OFFICE VISIT (OUTPATIENT)
Dept: FAMILY MEDICINE CLINIC | Facility: CLINIC | Age: 51
End: 2021-07-23
Payer: MEDICAID

## 2021-07-23 VITALS
HEIGHT: 66 IN | BODY MASS INDEX: 37.93 KG/M2 | SYSTOLIC BLOOD PRESSURE: 120 MMHG | TEMPERATURE: 98 F | WEIGHT: 236 LBS | HEART RATE: 80 BPM | OXYGEN SATURATION: 97 % | DIASTOLIC BLOOD PRESSURE: 68 MMHG

## 2021-07-23 DIAGNOSIS — M25.561 CHRONIC PAIN OF RIGHT KNEE: ICD-10-CM

## 2021-07-23 DIAGNOSIS — Z71.3 ENCOUNTER FOR WEIGHT LOSS COUNSELING: ICD-10-CM

## 2021-07-23 DIAGNOSIS — E28.2 PCOS (POLYCYSTIC OVARIAN SYNDROME): ICD-10-CM

## 2021-07-23 DIAGNOSIS — Z00.00 LABORATORY EXAM ORDERED AS PART OF ROUTINE GENERAL MEDICAL EXAMINATION: ICD-10-CM

## 2021-07-23 DIAGNOSIS — T50.905A MEDICATION SIDE EFFECT, INITIAL ENCOUNTER: ICD-10-CM

## 2021-07-23 DIAGNOSIS — E66.09 CLASS 2 OBESITY DUE TO EXCESS CALORIES WITH BODY MASS INDEX (BMI) OF 38.0 TO 38.9 IN ADULT, UNSPECIFIED WHETHER SERIOUS COMORBIDITY PRESENT: ICD-10-CM

## 2021-07-23 DIAGNOSIS — M71.21 SYNOVIAL CYST OF RIGHT POPLITEAL SPACE: ICD-10-CM

## 2021-07-23 DIAGNOSIS — Z71.85 VACCINE COUNSELING: ICD-10-CM

## 2021-07-23 DIAGNOSIS — G89.29 CHRONIC PAIN OF RIGHT KNEE: ICD-10-CM

## 2021-07-23 DIAGNOSIS — E06.3 ACQUIRED AUTOIMMUNE HYPOTHYROIDISM: ICD-10-CM

## 2021-07-23 DIAGNOSIS — Z51.81 THERAPEUTIC DRUG MONITORING: Primary | ICD-10-CM

## 2021-07-23 PROCEDURE — 99214 OFFICE O/P EST MOD 30 MIN: CPT | Performed by: FAMILY MEDICINE

## 2021-07-23 PROCEDURE — 3078F DIAST BP <80 MM HG: CPT | Performed by: FAMILY MEDICINE

## 2021-07-23 PROCEDURE — 3074F SYST BP LT 130 MM HG: CPT | Performed by: FAMILY MEDICINE

## 2021-07-23 PROCEDURE — 3008F BODY MASS INDEX DOCD: CPT | Performed by: FAMILY MEDICINE

## 2021-07-23 RX ORDER — METFORMIN HYDROCHLORIDE 500 MG/1
500 TABLET, EXTENDED RELEASE ORAL
Qty: 30 TABLET | Refills: 1 | Status: SHIPPED | OUTPATIENT
Start: 2021-07-23

## 2021-07-23 NOTE — PATIENT INSTRUCTIONS
-Recommend take Florastor twice a day for 2 weeks, then try taking the Metformin extended release 500 mg tablet with dinner. Please let Dr. Germán Contreras know if you are still not able to tolerate the Metformin.

## 2021-07-23 NOTE — PROGRESS NOTES
Meldon Lesches is a 52year old female. HPI:       Obesity:  Watery stools since taking metformin, pt states she did not report in past b/c she thought it would go away. Stopped taking the metformin a month ago.     Diet:  Avoids sugar, bread, potatoe hypothyroidism.    • Endometriosis    • RENE (generalized anxiety disorder) 7/10/2017   • Herpes zoster without complication 2/68/6630    3rd Episode    • Major depressive disorder, recurrent episode, severe with anxious distress ([facility] 11/11/2019   • Migrain complaints of any unusual skin lesions or rashes   GI: Denies blood in stool or black stool.   NEURO: No complaints of headaches/dizziness  PSYCH: No complaints of depression and anxiety      Immunization History  Administered            Date(s) Administere Encounter for weight loss counseling  4. PCOS (polycystic ovarian syndrome)  Patient continues to gain weight. Uncertain if this is completely due to lifestyle or if hypothyroidism is a contributing factor.   Patient has been intolerant of Metformin which CBC With Differential With Platelet      Comp Metabolic Panel (14)      Lipid Panel      Assay, Thyroid Stim Hormone      Free T4, (Free Thyroxine)      Meds & Refills for this Visit:  Requested Prescriptions     Signed Prescriptions Disp Refills   • metF

## 2022-04-18 NOTE — TELEPHONE ENCOUNTER
Dr Riojas Drivers advise. Is there a lower dose available? MEDICAL ELIGIBILITY FORM    Name: Roslyn Dang YOB: 2004     Roslyn is Medically eligible for all sports without restriction    Recommendations: N/A    I have examined the student named on this form and completed the preparticipation physical evaluation. The athlete does not have apparent clinical contraindications to practice and can participate in the sport(s) as outlined on this form. A copy of the physical examination findings are on record in my office and can be made available to the school at the request of the parents. If conditions arise after the athlete has been cleared for participation, the physician may rescind the medical eligibility until the problem is resolved and the potential consequences are completely explained to the athlete (and parents or guardians).    Name of health care professional (print or type): Betsey Neely MD Date: 4/18/2022     Address: [facility] [place] [address] [place] [address]  Dept: [phone]     Signature of health care professional: _______________________________________      SHARED EMERGENCY INFORMATION    Allergies   Allergen Reactions   • Cephalexin HIVES       Current Outpatient Medications   Medication Instructions   • rizatriptan (Maxalt) 5 MG tablet Take 1 tablet by mouth at onset of migraine. May repeat after 2 hours if needed.  Three max per day   • SUMAtriptan (Imitrex) 25 MG tablet Give one tab on onset of migraine if not helping may give another in 2 hours, no more than 3 in a day       Other information:_____________________________________________________________________  _____________________________________________________________________________________  _____________________________________________________________________________________    Emergency  contacts:___________________________________________________________________  _____________________________________________________________________________________  _____________________________________________________________________________________     © 2019 [place] [employer], American Academy of Pediatrics, American College of Sport Medicine, American Medical Society for Sports Medicine, American Orthopaedics Society for Sport Medicine, and American Osteopathic Academy of Sports Medicine.  Permission is granted to reprint for noncommercial, educational purposes with acknowledgement.      PHYSICAL EXAMINATION FORM     Name: Roslyn Dang YOB: 2004   PHYSICIAN REMINDERS  1. Consider additional questions on more-sensitive issues.  · Do you feel stressed out or under a lot of pressure?  · Do you feel sad, hopeless, depressed or anxious?  · Do you feel safe at your home or residence?  · During the past 30 days, did you use chewing tobacco, snuff or dip?  · Do you drink alcohol or user any other drugs?  · Have you even taken anabolic steroids or used any other performance-enhancing supplement?  · Have you even take any supplements to help you gain or lose weight or improve your performance?  · Do you wear a seat belt, use a helmet, and use condoms?  2.  Consider reviewing questions on cardiovascular symptoms (Q4-Q13 of History Form).    EXAMINATION     Vitals: /70   Pulse 80   Temp 97 °F (36.1 °C) (Temporal)   Resp 12   Ht 5' 11\" (1.803 m)   Wt (!) 109.3 kg (241 lb)   BMI 33.61 kg/m²   BSA 2.28 m²    Vision: R 20/               L 20/                      Corrected  Y         N     MEDICAL NORMAL ABNORMAL FINDINGS   Appearance  · Marfan stigmata (kyphoscoliosis, high-arched palate, pectus excavatum, arachnodactyly, arm span > height, hyperlaxity, myopia, MVP, aortic insufficiency) Yes    Eyes, ears, nose, throat  · Pupils equal  · Hearing Yes    Lymph nodes Yes     Heart*  · Murmurs (auscultation standing, auscultation supine, +/- Valsalva maneuver) Yes    Lungs Yes    Abdomen Yes    Skin  · Herpes simplex virus (HSV), lesions suggestive of methicillin-resistant Staphylococcus aureus MRSA, or tinea corporis Yes    MUSCULOSKELETAL NORMAL ABNORMAL FINDINGS   Neck Yes    Back Yes    Shoulder and arm Yes    Elbow and forearm Yes    Wrist, hand, and fingers Yes    Hip and thigh Yes    Knee Yes    Leg and ankle Yes    Foot and toes Yes    Functional  · Double-leg squat test, single-leg squat test, and box drop or step drop test Yes    * Consider electrocardiography ECG, echocardiogram, referral to cardiology for abnormal cardiac history or examination finding, or a combination of those.  Name of health care professional (print or type): Betsey Neely MD  Date: 4/18/2022  Address: [facility] [place] [address] [facility] [address]  Dept: [phone]   Signature of health care professional: _______________________________________    © 2019 American Academy of Family Physicians, American Academy of Pediatrics, American College of Sport Medicine, American Medical Society for Sports Medicine, American Orthopaedics Society for Sport Medicine, and American Osteopathic Academy of Sports Medicine.  Permission is granted to reprint for noncommercial, educational purposes with acknowledgement.         Name: Roslyn Dang YOB: 2004   Supplemental COVID-19 questions     1.  Have you had any of the following symptoms in the past 14 days?           a) Fever or chills Yes  /  No          b) Cough Yes  /  No          c) Shortness of breath or difficulty breathing Yes  /  No          d) Fatigue Yes  /  No          e) Muscle or body aches Yes  /  No          f) Headache Yes  /  No          g) New loss of taste or smell Yes  /  No          h) Sore throat Yes  /  No          i) Congestion or runny nose Yes  /  No          j) Nausea or vomiting Yes  /  No           k) Diarrhea Yes  /  No          l) Date symptoms started _______          m) Date symptoms resolved _______   2.  Have you ever had a positive test for COVID-19? Yes  /  No          If yes _______                 i.  Date of test Yes  /  No                 ii. Were you tested because you had symptoms? Yes  /  No                 If yes:                         a) Date symptoms started _______                        b) Date symptoms resolved _______                        c) Were you hospitalized? Yes  /  No                        d) Did you have fever > 100.4 F.? Yes  /  No                               If yes, how many days did your fever last? _______                        e) Did you have muscle aches, chills, or lethargy? Yes  /  No                               If yes, how many days did these symptoms last? _______                        f) Have you had the vaccine? Yes  /  No                 iii. Were you tested because you were exposed to someone with COVID-19, but you did not have                     any symptoms? Yes  /  No   3. Has anyone living in your household had any of the following symptoms or tested positive for COVID-19 in the past 14 days? Yes  /  No          If Yes, [place] the applicable symptoms.     • Fever or chills • Shortness of breath or difficulty breathing    • Muscle or body aches • New loss of taste or smell    • Nausea or vomiting • Congestion or runny nose    • Sore throat           • Headache           • Cough • Fatigue           • Diarrhea       4. Have you been within 6 feet for more than 15 minutes of someone with COVID-19 in the past 14 days? Yes  /  No          If yes: date(s) of exposure _______   5. Are you currently waiting on results from a recent COVID test? Yes  /  No     Sources:  • Interim Guidance on the Preparticipation Physical Examination... : Clinical Journal of Sport Medicine (lww.com)  • Supplemental COVID19 Questions (lww.com)  • COVID19 Interim Guidance:  Return to Sports and Physical Activity (aap.org)      HISTORY FORM  Note: Complete and sign this form (with your parents if younger than 18) before your appointment.  Name: Roslyn Dang YOB: 2004     Date of examination: 4/18/2022  Sport(s):_________________________________________   Sex assigned at birth: (F, M, or other): male How do you identify your gender?(F, M, or other):_________     List past and current medical conditions:____________________________________________________________________  _______________________________________________________________________________________________________________    Have you ever had surgery? If yes, list all past surgical procedures: ______________________________________________  _______________________________________________________________________________________________________________    Medicines and supplements: List all current prescriptions, over-the-counter medicines, and supplements (herbal and nutritional):   ______________________________________________________________________________________________________________  ______________________________________________________________________________________________________________    Do you have any allergies? If yes, please list all your allergies (ie, medicines, pollens, food, stinging insects).   ______________________________________________________________________________________________________________  ______________________________________________________________________________________________________________       Patient Health Questionnaire Version 4 (PHQ-4)  Over the last 2 weeks, how often have you been bothered by any of the following problems? ([place] response.)   Not at all Several days Over half the days Nearly every day   Feeling nervous, anxious, or on edge 0 1 2 3   Not being able to stop or control worrying 0 1 2 3   Little interest or pleasure in doing things 0 1 2 3    Feeling down, depressed, or hopeless 0 1 2 3   (A sum of ?3 is considered positive on either subscale [questions 1 and 2, or questions 3 and 4] for screening purposes.)     GENERAL QUESTIONS  (Explain “Yes” answers at the end of this form.  [place] questions if you don’t know the answer.)     Yes     No   1. Do you have any concerns that you would like to discuss with your provider?       2. Has a provider ever denied or restricted your participation in sports for any reason?       3. Do you have any ongoing medical issues or recent illness?       HEART HEALTH QUESTIONS ABOUT YOU Yes No   4. Have you ever passed out or nearly passed out during or after exercise?       5. Have you ever had discomfort, pain, tightness, or pressure in your chest during exercise?       6. Does your heart ever race, flutter in your chest, or skip beats (irregular beats) during exercise?       7. Has a doctor ever told you that you have any heart problems?       8. Has a doctor ever requested a test for your heart? For example, electrocardiography (ECG) or echocardiography.      HEART HEALTH QUESTIONS ABOUT YOU (CONTINUED ) Yes No   9. Do you get light-headed or feel shorter of breath than your friends during exercise?       10. Have you ever had a seizure?       HEART HEALTH QUESTIONS ABOUT YOUR FAMILY Yes No   11. Has any family member or relative  of heart problems or had an unexpected or unexplained sudden death before age 35 years (including drowning or unexplained car crash)?       12. Does anyone in your family have a genetic heart problem such as hypertrophic cardiomyopathy (HCM), Marfan syndrome, arrhythmogenic right ventricular cardiomyopathy (ARVC), long QT syndrome (LQTS), short QT syndrome (SQTS), Brugada syndrome, or catecholaminergic polymorphic ventricular tachycardia (CPVT)?       13. Has anyone in your family had a pacemaker or an implanted defibrillator before age 35?                Name: Roslyn Dang Date of birth:  2004     BONE AND JOINT QUESTIONS Yes No   14. Have you ever had a stress fracture or an injury to a bone, muscle, ligament, joint, or tendon that caused you to miss a practice or game?       15. Do you have a bone, muscle, ligament, or joint injury that bothers you?       MEDICAL QUESTIONS Yes No   16. Do you cough, wheeze, or have difficulty breathing during or after exercise?       17. Are you missing a kidney, an eye, a testicle (males), your spleen, or any other organ?       18. Do you have groin or testicle pain or a painful bulge or hernia in the groin area?       19. Do you have any recurring skin rashes or rashes that come and go, including herpes or methicillin-resistant Staphylococcus aureus  (MRSA)?       20. Have you had a concussion or head injury that caused confusion, a prolonged headache, or memory problems?       21. Have you ever had numbness, had tingling, had weakness in your arms or legs, or been unable to move your arms or legs after being hit or falling?       22. Have you ever become ill while exercising in the heat?       23. Do you or does someone in your family have sickle cell trait or disease?       24. Have you ever had or do you have any problems with your eyes or vision?        MEDICAL QUESTIONS (CONTINUED ) Yes No   25. Do you worry about your weight?         26. Are you trying to or has anyone recommended that you gain or lose weight?       27. Are you on a special diet or do you avoid certain types of foods or food groups?       28. Have you ever had an eating disorder?       FEMALES ONLY     29. Have you ever had a menstrual period?       30. How old were you when you had your first menstrual period?       31. When was your most recent menstrual period?       32. How many periods have you had in the past 12 months?         Explain \"Yes\" answers  here.  ______________________________________________    ______________________________________________    ______________________________________________    ______________________________________________    ______________________________________________    ______________________________________________    ______________________________________________    ______________________________________________     I hereby state that, to the best of my knowledge, my answers to the questions on this form are complete and correct.    Signature of athlete: ____________________________________________________________________________________    Signature of parent or guardian: ___________________________________________________________________________  Date: ________________________________________________  _____________________________________________________________________________________________________  © 2019 American Academy of Family Physicians, American Academy of Pediatrics, American College of Sports Medicine, American Medical Society for Sports Medicine, American Orthopaedic Society for Sports Medicine, and American Osteopathic Academy of Sports Medicine. Permission is granted to reprint for noncommercial, educational purposes with acknowledgment.

## 2023-04-30 NOTE — ED INITIAL ASSESSMENT (HPI)
C/o left groin pain for 2 weeks, unable to apply pressure walking. Taking Tylenol no relief. Got pushed by 60 pounds dog to groin area.

## 2023-04-30 NOTE — DISCHARGE INSTRUCTIONS
Please return to the Emergency department/clinic if symptoms worsen or you develop new symptoms. Follow up with your primary care physician in 2 days. Take any medications prescribed to you as instructed. You may take 200 mg of ibuprofen with 500 mg of acetaminophen every 4 hours for pain control.

## 2023-05-01 NOTE — TELEPHONE ENCOUNTER
Please call patient and inform her that her chart was brought to my attention due to her recent visit at the [facility] immediate care. I recommend the patient make an appointment to see me for her wellness visit and follow-up on hypothyroidism.   If patient has a new PCP, please forward this to [facility] for PCP removal.

## 2023-05-01 NOTE — TELEPHONE ENCOUNTER
- Please schedule X-Rays   Patient called verified she is coming in for Left hip. Previous x rays were non-weight bearing. Weight bearing x rays ordered. Patient notified to come in 15 minutes early for x-rays.       Future Appointments   Date Time Provider Laura Burdick   5/3/2023  8:30 AM RIKY Neal [id number]   6/15/2023 10:30 AM Christo Deal DO [facility] [place]

## 2023-05-01 NOTE — TELEPHONE ENCOUNTER
Called pt and scheduled wellness visit. Pt would like to have blood orders prior to appt. Please advise.     Future Appointments   Date Time Provider Laura Burdick   6/15/2023 10:30 AM Deja To, DO [facility] [place]

## 2024-01-13 NOTE — ED NOTES
>Received patient ambulatory to room, alert x oriented x 3 breathing easy, not in any distress, slightly limping  with gait steady. Pt refused wheelchair offered  >Use of call light instructed and within patient's reach. Patient verbalized understanding.
Pt back from CT /xray. Warm blanket and pillow provided. Pt made comfortable. Bed on low fowlers. 1 side rail up. Call light within reach. Family at bedside.
Pt c/o pain on the right ankle after visual acuity done  Pt crying a little.  Still refused pain medication offered
no

## 2024-06-07 NOTE — TELEPHONE ENCOUNTER
Patient walked in office today to sign Medical Records Release Forms for records. Patient is moving to [place], [place] and unsure where her new pcp and specialists contact information. Patient completed SARATH forms and will call our office once she has all new providers contact information to process medical records.    Signed SARATH's are being held in supervisor/lead's office.    Once patient has new pcp with place order for pcp removal.

## 2024-10-15 NOTE — PROCEDURES
The office order for PCP removal request is Approved and finalized on October 15, 2024.    Removed Gemma Rosa DO as the patient's Primary Care Physician

## (undated) DEVICE — TROCAR: Brand: KII SHIELDED BLADED ACCESS SYSTEM

## (undated) DEVICE — TROCAR: Brand: KII® SLEEVE

## (undated) DEVICE — DRAPE C-ARM UNIVERSAL

## (undated) DEVICE — KENDALL SCD EXPRESS SLEEVES, KNEE LENGTH, MEDIUM: Brand: KENDALL SCD

## (undated) DEVICE — MONOFILAMENT ABSORBABLE SUTURE: Brand: MAXON

## (undated) DEVICE — VISUALIZATION SYSTEM: Brand: CLEARIFY

## (undated) DEVICE — OPEN-END FLEXI-TIP URETERAL CATHETER: Brand: FLEXI-TIP

## (undated) DEVICE — CHLORAPREP 26ML APPLICATOR

## (undated) DEVICE — ZIPWIRE GUIDEWIRE .035X150 STR

## (undated) DEVICE — LIGAMAX 5 MM ENDOSCOPIC MULTIPLE CLIP APPLIER: Brand: LIGAMAX

## (undated) DEVICE — Device

## (undated) DEVICE — SOL  .9 1000ML BTL

## (undated) DEVICE — STERILE POLYISOPRENE POWDER-FREE SURGICAL GLOVES: Brand: PROTEXIS

## (undated) DEVICE — TISSUE RETRIEVAL SYSTEM: Brand: INZII RETRIEVAL SYSTEM

## (undated) DEVICE — SOL  .9 1000ML BAG

## (undated) DEVICE — SUTURE MONOCRYL 4-0 PS-2

## (undated) DEVICE — LAP CHOLE/APPY CDS-LF: Brand: MEDLINE INDUSTRIES, INC.

## (undated) NOTE — LETTER
17    Patient: Abby Winslow  : 1970 Visit date: 2017    Dear  Dr. Sylvia Brannon, ,    Abby Winslow presented to my office for follow-up. Please find my assessment and plan below.              Assessment   Diarrhea, unspecified type  (primary e

## (undated) NOTE — LETTER
Caleb Vences, :1970    CONSENT FOR PROCEDURE/SEDATION    1. I authorize the performance upon Caleb Vences  the following: Colposcopy with biopsy and Endocervical curettage    2.  I authorize Dr. Lanie Lombardo MD (and whomever is designated as ____________________________________________    Witness: _________________________________________ Date:___________     Physician Signature: _______________________________ Date:___________

## (undated) NOTE — LETTER
Date & Time: 6/9/2021, 9:49 AM  Patient: Lesia Talavera  Encounter Provider(s):    Nat Hugo MD       To Whom It May Concern:    Lesia Talavera was seen and treated in our department on 6/9/2021. She should not return to work until 6/14.     If y

## (undated) NOTE — ED AVS SNAPSHOT
[facility] [address]    Phone:  [phone]    Fax:  [phone]           Abby Days   MRN: [id number]    Department:  THE [facility] in [facility]   Date of Visit:  5/15/2017 is inflammed. Inflammation causes warmth, so ice may give some relief. You may ice through a brace or ace wrap. Compression: Compression to the area will help control swelling. An ace wrap is the most simple form of compression.  You can also wear a b You were examined and treated today on an urgent basis only. This was not a substitute for ongoing medical care. Often, one Immediate Care visit does not uncover every injury or illness.  If you have been referred to a primary care or a specialist physicia Miley Brock [address] ([address]) [address]  Luis Felipe Simental ([facility]) [phone] [address] ([address]) [phone]                Additional Information       We are concerned for your o Impression:    CONCLUSION:  No acute findings.            Dictated by: Sarah Maki MD on 5/15/2017 at 17:23       Approved by: Sarah Maki MD              Narrative:    PROCEDURE:  XR WRIST NAVICULAR COMPLETE (3 VIEWS), RIGHT (CPT=73110)     TECHNI

## (undated) NOTE — LETTER
Date: 5/3/2023    Patient Name: Jericho Morrissey          To Whom it may concern: This letter has been written at the patient's request. The above patient was seen at the [facility] for treatment of a medical condition. This patient can return to work as tolerated. Sincerely,          ILDA Carvajal, ROSLYN Orthopedic Surgery / Sports Medicine Specialist  [facility]  [address]   [email or web address]  [email or web address]  t: [phone]  o: [phone]  f: [phone]          This note was dictated using Dragon software. While it was briefly proofread prior to completion, some grammatical, spelling, and word choice errors due to dictation may still occur.

## (undated) NOTE — ED AVS SNAPSHOT
Maliha Figueroa   MRN: [id number]    Department:  [facility] Emergency Department   Date of Visit:  11/13/2017           Disclosure     Insurance plans vary and the physician(s) referred by the ER may not be covered by your plan.  Please contact your i If you have been prescribed any medication(s), please fill your prescription right away and begin taking the medication(s) as directed    If the emergency physician has read X-rays, these will be re-interpreted by a radiologist.  If there is a significant

## (undated) NOTE — LETTER
01/08/18        [facility] [address]      Dear Amalia Guadarrama,    Our records indicate that you have outstanding lab work and or testing that was ordered for you and has not yet been completed:          Adult Food Allergy Prof [E]  To

## (undated) NOTE — Clinical Note
UVALDO, HFU call made, see notes. NCM attempted to schedule HFU, patient states she will call to schedule a HFU with Dr. Alek Garing when she can check her work schedule. Patient reports she might not get her schedule until Monday 8/19/19.  Message sent to MD's of

## (undated) NOTE — LETTER
17    Patient: Arabella Addison  : 1970 Visit date: 2017    Dear  Dr. Ishmael Parker, DO,    Thank you for referring Arabella Addison to my practice. Please find my assessment and plan below.                 Assessment   Calculus of gallbladder with acut the patient's satisfaction after which the patient provided willing and informed consent to proceed with surgery. I did discuss with the patient the small chance that she may have persistent symptoms after surgery.   If that is the case then it is more l

## (undated) NOTE — LETTER
03/29/21        Flavio Ibarra  [address] [address]      Dear Davin Gonzalez records indicate that you have outstanding lab work and or testing that was ordered for you and has not yet been completed:  Orders Placed This Encounter      C

## (undated) NOTE — LETTER
05/23/18        Allison Jake  [address] [address]      Dear Dafne Orosco records indicate that you have outstanding lab work and or testing that was ordered for you and has not yet been completed:          CBC W Differential W Platelet [E]

## (undated) NOTE — LETTER
Date & Time: 4/30/2023, 4:29 PM  Patient: Ahsan Graham  Encounter Provider(s):    Adria Molina PA-C       To Whom It May Concern:    Ahsan Graham was seen and treated in our department on 4/30/2023. She should not return to work until released by orthopedics .     If you have any questions or concerns, please do not hesitate to call.        _____________________________  Physician/APC Signature

## (undated) NOTE — MR AVS SNAPSHOT
[facility] ResendezUlisses [facility]  [phone]               Thank you for choosing us for your health care visit with Justyn La DO.   We are glad to serve you and happy to provide you with this s Imaging:  JESÚS SCREENING BILAT (MNM=[id number])    Instructions: To schedule an appointment for your radiology test please call [facility] Scheduling at [phone].          Reason for Today's Visit     Well Adult           Medical Issues Discus exams and how often you need these or other tests. The frequency depends on your symptoms and your personal and family medical history.    Blood pressure measurement: all women   Clinical breast exam by your provider: at least every 3 years if you are 20 to lipids (cholesterol), or weight are high or you have a family history of type 2 diabetes   Chlamydia test: if you are sexually active and 22years old or younger or if you have a high risk of sexually transmitted disease (STD)   Gonorrhea and syphilis test 65, you should get the new Tdap booster to protect you better against whooping cough (pertussis) as well as tetanus. If you are 72 or older, this new vaccine has not yet been approved for your age group.  Because babies are most susceptible to complications while driving, swimming, boating, etc.   Diet and exercise: Try to maintain your weight at a comfortable, healthy level. Limit the fat and cholesterol in your diet. Include a lot of whole grains, fruits, and vegetables in your diet.  Get regular physical ac Take 2 tablets by mouth as needed. Vitamin B-12 1000 MCG Tabs   Take 1,000 mcg by mouth daily. Commonly known as:  VITAMIN B12           Vitamin D 1000 units Caps   Take by mouth daily.                 Where to Get Your Medications      These me

## (undated) NOTE — MR AVS SNAPSHOT
[facility] Kimberlyn Resendez [facility]  [phone]               Thank you for choosing us for your health care visit with Kenyon Dyson DO.   We are glad to serve you and happy to provide you with this s · Limit saturated fats and trans fats. Saturated fats raise your levels of cholesterol, so keep these fats to a minimum. They are found in foods such as fatty meats, whole milk, cheese, and palm and coconut oils.  Avoid trans fats because they lower good ch fats, such as olive oil, nuts, and fish. Try to have at least 2 servings per week of fatty fish such as salmon, sardines, mackerel, rainbow trout, and albacore tuna. These contain omega-3 fatty acids, which are good for your heart.  Flaxseed is another sour amoxicillin 875 MG Tabs   Take 1 tablet (875 mg total) by mouth every 12 (twelve) hours. Commonly known as:  AMOXIL           Phentermine HCl 15 MG Caps   Take 1 capsule (15 mg total) by mouth every morning.            PROBIOTIC OR   Take 2 tablets by mo Tips for increasing your physical activity – Adults who are physically active are less likely to develop some chronic diseases than adults who are inactive.      HOW TO GET STARTED: HOW TO STAY MOTIVATED:   Start activities slowly and build up over time Do

## (undated) NOTE — LETTER
17    Patient: Magnus Maldonado  : 1970 Visit date: 2017    Dear  Dr. Terra Deras, ,     Magnus Maldonado presented to my office for follow up. Please find my assessment and plan below.              Assessment   Follow-up examination  (primary encou

## (undated) NOTE — LETTER
08/14/19    Mitch James      To Whom It May Concern: This letter has been written at the patient's request. The above patient was seen at [facility] for treatment of a medical condition from 8/12/19-8/14/19    The patient may return to work on 8/16.